# Patient Record
Sex: FEMALE | Race: AMERICAN INDIAN OR ALASKA NATIVE
[De-identification: names, ages, dates, MRNs, and addresses within clinical notes are randomized per-mention and may not be internally consistent; named-entity substitution may affect disease eponyms.]

---

## 2017-10-19 ENCOUNTER — HOSPITAL ENCOUNTER (EMERGENCY)
Dept: HOSPITAL 31 - C.ER | Age: 27
Discharge: HOME | End: 2017-10-19
Payer: COMMERCIAL

## 2017-10-19 VITALS — SYSTOLIC BLOOD PRESSURE: 150 MMHG | DIASTOLIC BLOOD PRESSURE: 93 MMHG

## 2017-10-19 VITALS — TEMPERATURE: 98 F | HEART RATE: 89 BPM | RESPIRATION RATE: 16 BRPM | OXYGEN SATURATION: 99 %

## 2017-10-19 VITALS — BODY MASS INDEX: 25.3 KG/M2

## 2017-10-19 DIAGNOSIS — K31.84: ICD-10-CM

## 2017-10-19 DIAGNOSIS — E11.43: Primary | ICD-10-CM

## 2017-10-19 DIAGNOSIS — R11.10: ICD-10-CM

## 2017-10-19 DIAGNOSIS — Z87.891: ICD-10-CM

## 2017-10-19 DIAGNOSIS — I10: ICD-10-CM

## 2017-10-19 DIAGNOSIS — M81.0: ICD-10-CM

## 2017-10-19 LAB
ALBUMIN/GLOB SERPL: 1.1 {RATIO} (ref 1–2.1)
ALP SERPL-CCNC: 70 U/L (ref 38–126)
ALT SERPL-CCNC: 60 U/L (ref 9–52)
AST SERPL-CCNC: 32 U/L (ref 14–36)
BASOPHILS # BLD AUTO: 0.1 K/UL (ref 0–0.2)
BASOPHILS NFR BLD: 0.9 % (ref 0–2)
BILIRUB SERPL-MCNC: 0.8 MG/DL (ref 0.2–1.3)
BILIRUB UR-MCNC: NEGATIVE MG/DL
BUN SERPL-MCNC: 15 MG/DL (ref 7–17)
CALCIUM SERPL-MCNC: 9.8 MG/DL (ref 8.6–10.4)
CHLORIDE SERPL-SCNC: 100 MMOL/L (ref 98–107)
CO2 SERPL-SCNC: 23 MMOL/L (ref 22–30)
EOSINOPHIL # BLD AUTO: 0 K/UL (ref 0–0.7)
EOSINOPHIL NFR BLD: 0.4 % (ref 0–4)
ERYTHROCYTE [DISTWIDTH] IN BLOOD BY AUTOMATED COUNT: 13.3 % (ref 11.5–14.5)
GLOBULIN SER-MCNC: 4.5 GM/DL (ref 2.2–3.9)
GLUCOSE SERPL-MCNC: 203 MG/DL (ref 65–105)
GLUCOSE UR STRIP-MCNC: NORMAL MG/DL
HCT VFR BLD CALC: 39.7 % (ref 34–47)
HYALINE CASTS #/AREA URNS LPF: (no result) /LPF (ref 0–2)
KETONES UR STRIP-MCNC: NEGATIVE MG/DL
LEUKOCYTE ESTERASE UR-ACNC: (no result) LEU/UL
LYMPHOCYTES # BLD AUTO: 1.7 K/UL (ref 1–4.3)
LYMPHOCYTES NFR BLD AUTO: 18.3 % (ref 20–40)
MCH RBC QN AUTO: 28.1 PG (ref 27–31)
MCHC RBC AUTO-ENTMCNC: 33.9 G/DL (ref 33–37)
MCV RBC AUTO: 83 FL (ref 81–99)
MONOCYTES # BLD: 0.5 K/UL (ref 0–0.8)
MONOCYTES NFR BLD: 5.4 % (ref 0–10)
NRBC BLD AUTO-RTO: 0 % (ref 0–2)
PH UR STRIP: 5 [PH] (ref 5–8)
PLATELET # BLD: 339 K/UL (ref 130–400)
PMV BLD AUTO: 7.7 FL (ref 7.2–11.7)
POTASSIUM SERPL-SCNC: 3.7 MMOL/L (ref 3.6–5.2)
PROT SERPL-MCNC: 9.3 G/DL (ref 6.3–8.3)
PROT UR STRIP-MCNC: (no result) MG/DL
RBC # UR STRIP: NEGATIVE /UL
RBC #/AREA URNS HPF: 3 /HPF (ref 0–3)
SODIUM SERPL-SCNC: 138 MMOL/L (ref 132–148)
SP GR UR STRIP: 1.01 (ref 1–1.03)
UROBILINOGEN UR-MCNC: NORMAL MG/DL (ref 0.2–1)
WBC # BLD AUTO: 9.1 K/UL (ref 4.8–10.8)
WBC #/AREA URNS HPF: < 1 /HPF (ref 0–5)

## 2017-10-19 PROCEDURE — 81001 URINALYSIS AUTO W/SCOPE: CPT

## 2017-10-19 PROCEDURE — 85025 COMPLETE CBC W/AUTO DIFF WBC: CPT

## 2017-10-19 PROCEDURE — 84703 CHORIONIC GONADOTROPIN ASSAY: CPT

## 2017-10-19 PROCEDURE — 82948 REAGENT STRIP/BLOOD GLUCOSE: CPT

## 2017-10-19 PROCEDURE — 96375 TX/PRO/DX INJ NEW DRUG ADDON: CPT

## 2017-10-19 PROCEDURE — 80053 COMPREHEN METABOLIC PANEL: CPT

## 2017-10-19 PROCEDURE — 84702 CHORIONIC GONADOTROPIN TEST: CPT

## 2017-10-19 PROCEDURE — 96374 THER/PROPH/DIAG INJ IV PUSH: CPT

## 2017-10-19 PROCEDURE — 83690 ASSAY OF LIPASE: CPT

## 2017-10-19 PROCEDURE — 99284 EMERGENCY DEPT VISIT MOD MDM: CPT

## 2017-10-19 NOTE — C.PDOC
History Of Present Illness


27 year old female, with history of chronic episodes of vomiting, presents to 

the ED for evaluation of nausea and vomiting which began around 3 days ago. 

Patient denies fever, chills, or changes in bowel movements at this time. 


Chief Complaint (Nursing): GI Problem


History Per: Patient


History/Exam Limitations: no limitations


Onset/Duration Of Symptoms: Days (3)


Current Symptoms Are (Timing): Still Present


Associated Symptoms: Nausea, Vomiting.  denies: Fever, Chills, Diarrhea, 

Constipation


Additional History Per: Patient


Abnormal Vaginal Bleeding: No





Past Medical History


Reviewed: Historical Data, Nursing Documentation, Vital Signs


Vital Signs: 


 Last Vital Signs











Temp  97.6 F   10/19/17 04:19


 


Pulse  92 H  10/19/17 04:19


 


Resp  18   10/19/17 04:19


 


BP  150/93 H  10/19/17 04:19


 


Pulse Ox  97   10/19/17 05:38














- Medical History


PMH: Anxiety, Depression, Diabetes, Gastritis, HTN, Osteoporosis (pt unsure of 

this hx), Sickle Cell Disease ("JUST FOUND OUT A MONTH AGO"), Chronic Pain


   Denies: Crohn's Disease, Diverticulitis, Gall Bladder Disease, HIV, 

Pancreatitis, Chronic Kidney Disease


Surgical History: Endoscopy





- [facility] Procedures








ESOPHAGOGASTRODUODENOSCOPY [EGD] W/CLOSED BIOPSY (06/06/14)


INJECT/INFUSE ELECTROLYT (09/15/15)


INJECT/INFUSE NEC (09/15/15)








Family History: States: Unknown Family Hx, Diabetes





- Social History


Hx Tobacco Use: Yes


Hx Alcohol Use: No


Hx Substance Use: Yes





- Immunization History


Hx Tetanus Toxoid Vaccination: No


Hx Influenza Vaccination: Yes


Hx Pneumococcal Vaccination: No





Review Of Systems


Constitutional: Negative for: Fever, Chills


Gastrointestinal: Positive for: Nausea, Vomiting.  Negative for: Diarrhea, 

Constipation





Physical Exam





- Physical Exam


Appears: Non-toxic, No Acute Distress


Skin: Normal Color, Warm, Dry


Head: Atraumatic, Normacephalic


Eye(s): bilateral: Normal Inspection


Oral Mucosa: Dry


Neck: Supple


Chest: Symmetrical, No Deformity, No Tenderness


Cardiovascular: Rhythm Regular, No Murmur


Respiratory: Normal Breath Sounds, No Rales, No Rhonchi, No Wheezing


Gastrointestinal/Abdominal: Soft, No Tenderness, No Guarding, No Rebound


Extremity: Normal ROM, Capillary Refill (less than 2 seconds )


Neurological/Psych: Oriented x3, Normal Speech, Normal Cognition


Gait: Steady





ED Course And Treatment





- Laboratory Results


Result Diagrams: 


 10/19/17 04:38





 10/19/17 04:38


O2 Sat by Pulse Oximetry: 97 (on RA)


Pulse Ox Interpretation: Normal


Progress Note: Bloodwork and UA ordered and reviewed. Benadryl IVP, Reglan IVP 

and IV Fluids ordered and reviewed.





Disposition


Counseled Patient/Family Regarding: Diagnosis





- Disposition


Referrals: 


Non [facility] Provider, [Primary Care Provider] - 


Disposition: HOME/ ROUTINE


Disposition Time: 05:37


Condition: STABLE


Prescriptions: 


Metoclopramide [Reglan] 1 tab PO TID PRN #25 tab


 PRN Reason: Nausea/Vomiting


Instructions:  Acute Nausea and Vomiting (ED)


Forms:  [facility] (English)





- Clinical Impression


Clinical Impression: 


 Vomiting, Gastroparesis diabeticorum








- Scribe Statement


The provider has reviewed the documentation as recorded by the Scribe (Amalia Angel)


Provider Attestation: 








All medical record entries made by the Scribe were at my direction and 

personally dictated by me. I have reviewed the chart and agree that the record 

accurately reflects my personal performance of the history, physical exam, 

medical decision making, and the department course for this patient. I have 

also personally directed, reviewed, and agree with the discharge instructions 

and disposition.

## 2017-12-02 ENCOUNTER — HOSPITAL ENCOUNTER (EMERGENCY)
Dept: HOSPITAL 31 - C.ER | Age: 27
Discharge: HOME | End: 2017-12-02
Payer: COMMERCIAL

## 2017-12-02 VITALS — TEMPERATURE: 98.8 F

## 2017-12-02 VITALS — SYSTOLIC BLOOD PRESSURE: 152 MMHG | OXYGEN SATURATION: 97 % | DIASTOLIC BLOOD PRESSURE: 98 MMHG

## 2017-12-02 VITALS — HEART RATE: 79 BPM | RESPIRATION RATE: 19 BRPM

## 2017-12-02 VITALS — BODY MASS INDEX: 25.3 KG/M2

## 2017-12-02 DIAGNOSIS — K31.84: Primary | ICD-10-CM

## 2017-12-02 DIAGNOSIS — Z87.891: ICD-10-CM

## 2017-12-02 DIAGNOSIS — G89.29: ICD-10-CM

## 2017-12-02 DIAGNOSIS — E11.9: ICD-10-CM

## 2017-12-02 DIAGNOSIS — R10.9: ICD-10-CM

## 2017-12-02 DIAGNOSIS — I10: ICD-10-CM

## 2017-12-02 LAB
ALBUMIN/GLOB SERPL: 1.2 {RATIO} (ref 1–2.1)
ALP SERPL-CCNC: 54 U/L (ref 38–126)
ALT SERPL-CCNC: 51 U/L (ref 9–52)
AST SERPL-CCNC: 33 U/L (ref 14–36)
BASOPHILS # BLD AUTO: 0 K/UL (ref 0–0.2)
BASOPHILS NFR BLD: 0.6 % (ref 0–2)
BILIRUB SERPL-MCNC: 0.5 MG/DL (ref 0.2–1.3)
BILIRUB UR-MCNC: NEGATIVE MG/DL
BUN SERPL-MCNC: 9 MG/DL (ref 7–17)
CALCIUM SERPL-MCNC: 8.3 MG/DL (ref 8.6–10.4)
CHLORIDE SERPL-SCNC: 106 MMOL/L (ref 98–107)
CO2 SERPL-SCNC: 26 MMOL/L (ref 22–30)
EOSINOPHIL # BLD AUTO: 0.1 K/UL (ref 0–0.7)
EOSINOPHIL NFR BLD: 0.9 % (ref 0–4)
ERYTHROCYTE [DISTWIDTH] IN BLOOD BY AUTOMATED COUNT: 13.3 % (ref 11.5–14.5)
GLOBULIN SER-MCNC: 3.3 GM/DL (ref 2.2–3.9)
GLUCOSE SERPL-MCNC: 138 MG/DL (ref 65–105)
GLUCOSE UR STRIP-MCNC: NORMAL MG/DL
HCT VFR BLD CALC: 39.5 % (ref 34–47)
KETONES UR STRIP-MCNC: NEGATIVE MG/DL
LEUKOCYTE ESTERASE UR-ACNC: (no result) LEU/UL
LYMPHOCYTES # BLD AUTO: 2.6 K/UL (ref 1–4.3)
LYMPHOCYTES NFR BLD AUTO: 38.4 % (ref 20–40)
MCH RBC QN AUTO: 27 PG (ref 27–31)
MCHC RBC AUTO-ENTMCNC: 33 G/DL (ref 33–37)
MCV RBC AUTO: 81.8 FL (ref 81–99)
MONOCYTES # BLD: 0.4 K/UL (ref 0–0.8)
MONOCYTES NFR BLD: 5.3 % (ref 0–10)
NRBC BLD AUTO-RTO: 0.1 % (ref 0–2)
PH UR STRIP: 5 [PH] (ref 5–8)
PLATELET # BLD: 272 K/UL (ref 130–400)
PMV BLD AUTO: 8.2 FL (ref 7.2–11.7)
POTASSIUM SERPL-SCNC: 3.9 MMOL/L (ref 3.6–5.2)
PROT SERPL-MCNC: 7.5 G/DL (ref 6.3–8.3)
PROT UR STRIP-MCNC: (no result) MG/DL
RBC # UR STRIP: (no result) /UL
RBC #/AREA URNS HPF: 3 /HPF (ref 0–3)
SODIUM SERPL-SCNC: 140 MMOL/L (ref 132–148)
SP GR UR STRIP: 1.01 (ref 1–1.03)
UROBILINOGEN UR-MCNC: NORMAL MG/DL (ref 0.2–1)
WBC # BLD AUTO: 6.7 K/UL (ref 4.8–10.8)
WBC #/AREA URNS HPF: 1 /HPF (ref 0–5)

## 2017-12-02 PROCEDURE — 81001 URINALYSIS AUTO W/SCOPE: CPT

## 2017-12-02 PROCEDURE — 85025 COMPLETE CBC W/AUTO DIFF WBC: CPT

## 2017-12-02 PROCEDURE — 99285 EMERGENCY DEPT VISIT HI MDM: CPT

## 2017-12-02 PROCEDURE — 96376 TX/PRO/DX INJ SAME DRUG ADON: CPT

## 2017-12-02 PROCEDURE — 83690 ASSAY OF LIPASE: CPT

## 2017-12-02 PROCEDURE — 74000: CPT

## 2017-12-02 PROCEDURE — 83605 ASSAY OF LACTIC ACID: CPT

## 2017-12-02 PROCEDURE — 96375 TX/PRO/DX INJ NEW DRUG ADDON: CPT

## 2017-12-02 PROCEDURE — 80053 COMPREHEN METABOLIC PANEL: CPT

## 2017-12-02 PROCEDURE — 84703 CHORIONIC GONADOTROPIN ASSAY: CPT

## 2017-12-02 PROCEDURE — 96374 THER/PROPH/DIAG INJ IV PUSH: CPT

## 2017-12-02 NOTE — C.PDOC
History Of Present Illness


27 year old female with Hx of gastroparesis presents to the ED c/o vomit and 

abdominal pain for the past 2 weeks. Patient states she went to her PMD who 

prescribed her Zofran, she presents today because according to her the pain 

worsened and she "can't take it". Patient denies fever, diarrhea, bloody stools

, hematuria, back pain, dizziness. 


Time Seen by Provider: 12/02/17 15:16


Chief Complaint (Nursing): GI Problem


History Per: Patient


History/Exam Limitations: no limitations


Onset/Duration Of Symptoms: Days


Current Symptoms Are (Timing): Still Present


Location Of Pain/Discomfort: Diffuse


Radiation Of Pain To:: None


Quality Of Discomfort: "Pain"


Associated Symptoms: Vomiting, Loss Of Appetite.  denies: Fever, Diarrhea


Recent travel outside of the [place]: No


Additional History Per: Patient


Abnormal Vaginal Bleeding: No





Past Medical History


Reviewed: Historical Data, Nursing Documentation, Vital Signs


Vital Signs: 


 Last Vital Signs











Temp  98.8 F   12/02/17 15:14


 


Pulse  84   12/02/17 15:14


 


Resp  22   12/02/17 15:14


 


BP  174/100 H  12/02/17 15:14


 


Pulse Ox  98   12/02/17 15:48














- Medical History


PMH: Anxiety, Depression, Diabetes, Gastritis, HTN, Osteoporosis (pt unsure of 

this hx), Sickle Cell Disease ("JUST FOUND OUT A MONTH AGO"), Chronic Pain


   Denies: Crohn's Disease, Diverticulitis, Gall Bladder Disease, HIV, 

Pancreatitis, Chronic Kidney Disease


Surgical History: Endoscopy





- [facility] Procedures








ESOPHAGOGASTRODUODENOSCOPY [EGD] W/CLOSED BIOPSY (06/06/14)


INJECT/INFUSE ELECTROLYT (09/15/15)


INJECT/INFUSE NEC (09/15/15)








Family History: States: Unknown Family Hx, Diabetes





- Social History


Hx Tobacco Use: Yes


Hx Alcohol Use: No


Hx Substance Use: Yes





- Immunization History


Hx Tetanus Toxoid Vaccination: No


Hx Influenza Vaccination: No


Hx Pneumococcal Vaccination: No





Review Of Systems


Constitutional: Negative for: Fever, Chills


Cardiovascular: Negative for: Chest Pain


Respiratory: Negative for: Cough, Shortness of Breath


Gastrointestinal: Positive for: Vomiting, Abdominal Pain.  Negative for: Nausea

, Diarrhea


Genitourinary: Negative for: Dysuria, Hematuria


Skin: Negative for: Rash


Neurological: Negative for: Weakness, Numbness





Physical Exam





- Physical Exam


Appears: Non-toxic, In Acute Distress (secondary to pain)


Skin: Normal Color, Warm, Dry


Head: Atraumatic, Normacephalic


Oral Mucosa: Moist, No Drooling


Throat: Normal, No Erythema, No Exudate


Neck: Normal ROM, Supple


Chest: Symmetrical


Cardiovascular: Rhythm Regular, No Murmur


Respiratory: Normal Breath Sounds, No Rales, No Rhonchi, No Wheezing


Gastrointestinal/Abdominal: Soft, Tenderness (Mild diffuse), No Organomegaly, 

No Mass, No Distention, No Guarding, No Rebound, No Hernia


Back: No CVA Tenderness


Extremity: Normal ROM, No Pedal Edema, No Calf Tenderness, No Deformity, No 

Swelling


Neurological/Psych: Oriented x3, Normal Speech, Normal Cognition


Gait: Steady





ED Course And Treatment





- Laboratory Results


Result Diagrams: 


 12/02/17 15:46





 12/02/17 15:46


O2 Sat by Pulse Oximetry: 98 (On RA)


Pulse Ox Interpretation: Normal


Progress Note: Plan:  -Blood work, UA ordered.  -Abdomen X -Ray ordered.  -IV 

fluids given.  -Pepecid 20 mg IVP given.  -Phenergan 25 mg IVP given.  -Zofran 

4 mg IVP given





Disposition


Doctor Will See Patient In The: Office


Counseled Patient/Family Regarding: Studies Performed, Diagnosis, Need For 

Followup, Rx Given





- Disposition


Disposition: HOME/ ROUTINE


Disposition Time: 18:15


Condition: FAIR


Prescriptions: 


oxyCODONE/Acetaminophen [Percocet 5/325 mg Tab] 1 ea PO TID PRN #10 tab


 PRN Reason: Pain, Moderate (4-7)


Instructions:  Abdominal Pain (ED)


Forms:  [facility] (English)





- Clinical Impression


Clinical Impression: 


 Gastroparesis, Chronic abdominal pain








- Scribe Statement


The provider has reviewed the documentation as recorded by the Scribe





Juan Urbano





All medical record entries made by the Scribe were at my direction and 

personally dictated by me. I have reviewed the chart and agree that the record 

accurately reflects my personal performance of the history, physical exam, 

medical decision making, and the department course for this patient. I have 

also personally directed, reviewed, and agree with the discharge instructions 

and disposition.

## 2017-12-02 NOTE — RAD
HISTORY:

abd pain  



COMPARISON:

No prior.



FINDINGS:



BOWEL:

No suspicious bowel gas pattern. No obstruction. No free air. 



BONES:

Normal.



OTHER FINDINGS:

No abnormal internal calcifications including the pelvis.



IMPRESSION:

Nonobstructive bowel gas pattern. No free intraperitoneal gas. CT is 

available for follow-up if clinically warranted.

## 2017-12-20 ENCOUNTER — HOSPITAL ENCOUNTER (EMERGENCY)
Dept: HOSPITAL 31 - C.ER | Age: 27
Discharge: HOME | End: 2017-12-20
Payer: COMMERCIAL

## 2017-12-20 VITALS — SYSTOLIC BLOOD PRESSURE: 132 MMHG | TEMPERATURE: 98 F | HEART RATE: 81 BPM | DIASTOLIC BLOOD PRESSURE: 89 MMHG

## 2017-12-20 VITALS — BODY MASS INDEX: 25.3 KG/M2

## 2017-12-20 VITALS — RESPIRATION RATE: 20 BRPM

## 2017-12-20 DIAGNOSIS — K31.84: ICD-10-CM

## 2017-12-20 DIAGNOSIS — Z87.891: ICD-10-CM

## 2017-12-20 DIAGNOSIS — G89.29: Primary | ICD-10-CM

## 2017-12-20 DIAGNOSIS — E11.43: ICD-10-CM

## 2017-12-20 DIAGNOSIS — I10: ICD-10-CM

## 2017-12-20 DIAGNOSIS — Z79.84: ICD-10-CM

## 2017-12-20 LAB
ALBUMIN/GLOB SERPL: 0.9 {RATIO} (ref 1–2.1)
ALP SERPL-CCNC: 47 U/L (ref 38–126)
ALT SERPL-CCNC: 61 U/L (ref 9–52)
AST SERPL-CCNC: 38 U/L (ref 14–36)
BASOPHILS # BLD AUTO: 0.1 K/UL (ref 0–0.2)
BASOPHILS NFR BLD: 1 % (ref 0–2)
BILIRUB SERPL-MCNC: 1.1 MG/DL (ref 0.2–1.3)
BILIRUB UR-MCNC: NEGATIVE MG/DL
BUN SERPL-MCNC: 15 MG/DL (ref 7–17)
CALCIUM SERPL-MCNC: 8.9 MG/DL (ref 8.6–10.4)
CHLORIDE SERPL-SCNC: 100 MMOL/L (ref 98–107)
CO2 SERPL-SCNC: 18 MMOL/L (ref 22–30)
EOSINOPHIL # BLD AUTO: 0 K/UL (ref 0–0.7)
EOSINOPHIL NFR BLD: 0.1 % (ref 0–4)
ERYTHROCYTE [DISTWIDTH] IN BLOOD BY AUTOMATED COUNT: 13.5 % (ref 11.5–14.5)
GLOBULIN SER-MCNC: 4.9 GM/DL (ref 2.2–3.9)
GLUCOSE SERPL-MCNC: 241 MG/DL (ref 65–105)
GLUCOSE UR STRIP-MCNC: (no result) MG/DL
GRAN CASTS #/AREA URNS LPF: 1 /LPF (ref 0–1)
HCT VFR BLD CALC: 40 % (ref 34–47)
KETONES UR STRIP-MCNC: (no result) MG/DL
LEUKOCYTE ESTERASE UR-ACNC: (no result) LEU/UL
LYMPHOCYTES # BLD AUTO: 1.2 K/UL (ref 1–4.3)
LYMPHOCYTES NFR BLD AUTO: 17.1 % (ref 20–40)
MCH RBC QN AUTO: 27.6 PG (ref 27–31)
MCHC RBC AUTO-ENTMCNC: 33.6 G/DL (ref 33–37)
MCV RBC AUTO: 82 FL (ref 81–99)
MONOCYTES # BLD: 0.2 K/UL (ref 0–0.8)
MONOCYTES NFR BLD: 3.2 % (ref 0–10)
NRBC BLD AUTO-RTO: 0 % (ref 0–2)
PH UR STRIP: 5 [PH] (ref 5–8)
PLATELET # BLD: 346 K/UL (ref 130–400)
PMV BLD AUTO: 8 FL (ref 7.2–11.7)
POTASSIUM SERPL-SCNC: 4.5 MMOL/L (ref 3.6–5.2)
PROT SERPL-MCNC: 9.5 G/DL (ref 6.3–8.3)
PROT UR STRIP-MCNC: (no result) MG/DL
RBC # UR STRIP: (no result) /UL
RBC #/AREA URNS HPF: 12 /HPF (ref 0–3)
SODIUM SERPL-SCNC: 134 MMOL/L (ref 132–148)
SP GR UR STRIP: 1.02 (ref 1–1.03)
UROBILINOGEN UR-MCNC: NORMAL MG/DL (ref 0.2–1)
WBC # BLD AUTO: 7.1 K/UL (ref 4.8–10.8)
WBC #/AREA URNS HPF: 1 /HPF (ref 0–5)

## 2017-12-20 PROCEDURE — 85025 COMPLETE CBC W/AUTO DIFF WBC: CPT

## 2017-12-20 PROCEDURE — 80053 COMPREHEN METABOLIC PANEL: CPT

## 2017-12-20 PROCEDURE — 81001 URINALYSIS AUTO W/SCOPE: CPT

## 2017-12-20 PROCEDURE — 84703 CHORIONIC GONADOTROPIN ASSAY: CPT

## 2017-12-20 PROCEDURE — 99284 EMERGENCY DEPT VISIT MOD MDM: CPT

## 2017-12-20 PROCEDURE — 96375 TX/PRO/DX INJ NEW DRUG ADDON: CPT

## 2017-12-20 PROCEDURE — 96374 THER/PROPH/DIAG INJ IV PUSH: CPT

## 2017-12-20 PROCEDURE — 83690 ASSAY OF LIPASE: CPT

## 2017-12-20 NOTE — C.PDOC
History Of Present Illness


27 year old female presents to the ER with a complaint of nausea and vomiting 

"all day". Denies fever, chills, or abdominal pain. Patient reports she vomited 

food 4 times. 





Patient has a Hx of diabetes, HTN, and gastroparesis and is on protonix, 

metformin, and lisinopril. She reports having an endoscopy done by Dr. Omer 

yesterday that she also reports was normal.


Time Seen by Provider: 12/20/17 05:04


Chief Complaint (Nursing): Abdominal Pain


History Per: Patient


History/Exam Limitations: no limitations


Onset/Duration Of Symptoms: Hrs


Current Symptoms Are (Timing): Still Present


Pain Scale Rating Of: 2


Radiation Of Pain To:: None


Quality Of Discomfort: Unable To Describe


Associated Symptoms: Nausea, Vomiting.  denies: Fever, Chills


Exacerbating Factors: None


Alleviating Factors: None


Recent travel outside of the [place]: No


Abnormal Vaginal Bleeding: No





Past Medical History


Reviewed: Historical Data, Nursing Documentation, Vital Signs


Vital Signs: 


 Last Vital Signs











Temp  98 F   12/20/17 07:07


 


Pulse  81   12/20/17 07:07


 


Resp  20   12/20/17 07:07


 


BP  132/89   12/20/17 07:07


 


Pulse Ox  97   12/20/17 07:07














- Medical History


PMH: Anxiety, Depression, Diabetes, Gastritis, HTN, Osteoporosis (pt unsure of 

this hx), Sickle Cell Disease ("JUST FOUND OUT A MONTH AGO"), Chronic Pain


Other PMH: gastroparesis


Surgical History: No Surg Hx





- [facility] Procedures








ESOPHAGOGASTRODUODENOSCOPY [EGD] W/CLOSED BIOPSY (06/06/14)


INJECT/INFUSE ELECTROLYT (09/15/15)


INJECT/INFUSE NEC (09/15/15)








Family History: States: Unknown Family Hx, Diabetes





- Social History


Hx Tobacco Use: Yes


Hx Alcohol Use: No


Hx Substance Use: No





- Immunization History


Hx Tetanus Toxoid Vaccination: Yes


Hx Influenza Vaccination: Yes


Hx Pneumococcal Vaccination: Yes





Review Of Systems


Constitutional: Negative for: Fever, Chills


Cardiovascular: Negative for: Chest Pain, Palpitations


Respiratory: Negative for: Cough, Shortness of Breath, SOB with Excertion, 

Pleuritic Pain, Sputum


Gastrointestinal: Positive for: Nausea, Vomiting.  Negative for: Abdominal Pain

, Diarrhea, Constipation, Melena, Hematochezia, Hematemesis, Rectal Pain


Genitourinary: Negative for: Dysuria, Hematuria


Musculoskeletal: Negative for: Neck Pain


Skin: Negative for: Rash


Neurological: Negative for: Weakness, Confusion





Physical Exam





- Physical Exam


Appears: Non-toxic, No Acute Distress


Skin: Normal Color, Warm, Dry


Head: Atraumatic, Normacephalic


Eye(s): bilateral: Normal Inspection


Oral Mucosa: Moist


Teeth: Normal Dentition


Gingiva: Normal Appearing


Throat: Normal


Neck: Normal


Chest: Symmetrical, No Tenderness


Cardiovascular: Rhythm Regular


Respiratory: Normal Breath Sounds, No Rales, No Rhonchi, No Wheezing


Gastrointestinal/Abdominal: Normal Exam, Bowel Sounds, Soft, No Tenderness, No 

Organomegaly, No Mass, No Distention, No Guarding, No Rebound, No Hernia, No 

Ascites


Back: Normal Inspection


Extremity: Normal ROM


Extremity: Bilateral: Atraumatic


Neurological/Psych: Oriented x3, Normal Speech





ED Course And Treatment





- Laboratory Results


Result Diagrams: 


 12/20/17 06:11





 12/20/17 06:11


O2 Sat by Pulse Oximetry: 98 (Room air)


Pulse Ox Interpretation: Normal





Medical Decision Making


Medical Decision Making: 





Plan:


* CMP


* Lipase


* CBC


* Obstructive series


* Upreg


* UA


* Maalox


* Donnatal


* Pepcid


* IV fluids


* Toradol


* Zofran





labs reviewed and unremarkable. Pt given GI cocktail and pepcid and zofran, and 

toradol and IVF. 





On reevaluation, patient reports improvement of pain and requests to go home, 

no indication for plain films at this time. 





Disposition





- Disposition


Referrals: 


[facility] [Outside]


[facility] [Outside]


[facility] [Outside]


Disposition: HOME/ ROUTINE


Disposition Time: 06:30


Condition: GOOD


Additional Instructions: 


return as needed for severe worsening pain


Instructions:  Abdominal Pain (ED)


Forms:  General Discharge Instructions, [facility] (English)





- Clinical Impression


Clinical Impression: 


 Abdominal pain, Gastroparesis, Gastroparesis due to DM, Chronic abdominal pain








- Scribe Statement


The provider has reviewed the documentation as recorded by the Scribe





Farshad Alford


All medical record entries made by the Scribe were at my direction and 

personally dictated by me. I have reviewed the chart and agree that the record 

accurately reflects my personal performance of the history, physical exam, 

medical decision making, and the department course for this patient. I have 

also personally directed, reviewed, and agree with the discharge instructions 

and disposition.

## 2017-12-21 VITALS — OXYGEN SATURATION: 98 %

## 2017-12-26 ENCOUNTER — HOSPITAL ENCOUNTER (OUTPATIENT)
Dept: HOSPITAL 42 - ED | Age: 27
Setting detail: OBSERVATION
LOS: 2 days | Discharge: HOME | End: 2017-12-28
Attending: HOSPITALIST | Admitting: HOSPITALIST
Payer: COMMERCIAL

## 2017-12-26 VITALS — BODY MASS INDEX: 23.8 KG/M2

## 2017-12-26 DIAGNOSIS — Z80.3: ICD-10-CM

## 2017-12-26 DIAGNOSIS — R11.10: ICD-10-CM

## 2017-12-26 DIAGNOSIS — K21.9: ICD-10-CM

## 2017-12-26 DIAGNOSIS — F12.90: ICD-10-CM

## 2017-12-26 DIAGNOSIS — G89.29: ICD-10-CM

## 2017-12-26 DIAGNOSIS — K59.03: Primary | ICD-10-CM

## 2017-12-26 DIAGNOSIS — F17.210: ICD-10-CM

## 2017-12-26 DIAGNOSIS — K76.0: ICD-10-CM

## 2017-12-26 DIAGNOSIS — I10: ICD-10-CM

## 2017-12-26 DIAGNOSIS — Z79.4: ICD-10-CM

## 2017-12-26 DIAGNOSIS — Z83.3: ICD-10-CM

## 2017-12-26 DIAGNOSIS — D57.1: ICD-10-CM

## 2017-12-26 DIAGNOSIS — M81.0: ICD-10-CM

## 2017-12-26 DIAGNOSIS — J18.9: ICD-10-CM

## 2017-12-26 DIAGNOSIS — E10.43: ICD-10-CM

## 2017-12-26 DIAGNOSIS — K31.84: ICD-10-CM

## 2017-12-26 LAB
ALBUMIN SERPL-MCNC: 4.8 G/DL (ref 3–4.8)
ALBUMIN/GLOB SERPL: 1.4 {RATIO} (ref 1.1–1.8)
ALT SERPL-CCNC: 55 U/L (ref 7–56)
APPEARANCE UR: CLEAR
AST SERPL-CCNC: 33 U/L (ref 14–36)
BACTERIA #/AREA URNS HPF: (no result) /[HPF]
BASOPHILS # BLD AUTO: 0.03 K/MM3 (ref 0–2)
BASOPHILS NFR BLD: 0.2 % (ref 0–3)
BILIRUB UR-MCNC: NEGATIVE MG/DL
BUN SERPL-MCNC: 10 MG/DL (ref 7–21)
CALCIUM SERPL-MCNC: 9.8 MG/DL (ref 8.4–10.5)
COLOR UR: (no result)
EOSINOPHIL # BLD: 0 10*3/UL (ref 0–0.7)
EOSINOPHIL NFR BLD: 0.2 % (ref 1.5–5)
EPI CELLS #/AREA URNS HPF: (no result) /HPF (ref 0–5)
ERYTHROCYTE [DISTWIDTH] IN BLOOD BY AUTOMATED COUNT: 13.6 % (ref 11.5–14.5)
GFR NON-AFRICAN AMERICAN: > 60
GLUCOSE UR STRIP-MCNC: NEGATIVE MG/DL
GRANULOCYTES # BLD: 9.97 10*3/UL (ref 1.4–6.5)
GRANULOCYTES NFR BLD: 82.8 % (ref 50–68)
HGB BLD-MCNC: 12.4 G/DL (ref 12–16)
LEUKOCYTE ESTERASE UR-ACNC: NEGATIVE LEU/UL
LIPASE SERPL-CCNC: 123 U/L (ref 23–300)
LYMPHOCYTES # BLD: 1.4 10*3/UL (ref 1.2–3.4)
LYMPHOCYTES NFR BLD AUTO: 11.7 % (ref 22–35)
MAGNESIUM SERPL-MCNC: 1.4 MG/DL (ref 1.7–2.2)
MCH RBC QN AUTO: 27.4 PG (ref 25–35)
MCHC RBC AUTO-ENTMCNC: 34.5 G/DL (ref 31–37)
MCV RBC AUTO: 79.2 FL (ref 80–105)
MONOCYTES # BLD AUTO: 0.6 10*3/UL (ref 0.1–0.6)
MONOCYTES NFR BLD: 5.1 % (ref 1–6)
PH UR STRIP: 6 [PH] (ref 4.7–8)
PLATELET # BLD: 278 10^3/UL (ref 120–450)
PMV BLD AUTO: 9.2 FL (ref 7–11)
PROT UR STRIP-MCNC: 30 MG/DL
RBC # BLD AUTO: 4.53 10^6/UL (ref 3.5–6.1)
RBC # UR STRIP: (no result) /UL
SP GR UR STRIP: 1.02 (ref 1–1.03)
URINE NITRATE: NEGATIVE
UROBILINOGEN UR STRIP-ACNC: 0.2 E.U./DL
WBC # BLD AUTO: 12 10^3/UL (ref 4.5–11)
WBC #/AREA URNS HPF: (no result) /HPF (ref 0–6)

## 2017-12-26 PROCEDURE — 96372 THER/PROPH/DIAG INJ SC/IM: CPT

## 2017-12-26 PROCEDURE — 96375 TX/PRO/DX INJ NEW DRUG ADDON: CPT

## 2017-12-26 PROCEDURE — 83690 ASSAY OF LIPASE: CPT

## 2017-12-26 PROCEDURE — 80053 COMPREHEN METABOLIC PANEL: CPT

## 2017-12-26 PROCEDURE — 84484 ASSAY OF TROPONIN QUANT: CPT

## 2017-12-26 PROCEDURE — 84100 ASSAY OF PHOSPHORUS: CPT

## 2017-12-26 PROCEDURE — 36415 COLL VENOUS BLD VENIPUNCTURE: CPT

## 2017-12-26 PROCEDURE — 93005 ELECTROCARDIOGRAM TRACING: CPT

## 2017-12-26 PROCEDURE — 96367 TX/PROPH/DG ADDL SEQ IV INF: CPT

## 2017-12-26 PROCEDURE — 80324 DRUG SCREEN AMPHETAMINES 1/2: CPT

## 2017-12-26 PROCEDURE — 83036 HEMOGLOBIN GLYCOSYLATED A1C: CPT

## 2017-12-26 PROCEDURE — 80345 DRUG SCREENING BARBITURATES: CPT

## 2017-12-26 PROCEDURE — 76705 ECHO EXAM OF ABDOMEN: CPT

## 2017-12-26 PROCEDURE — 99285 EMERGENCY DEPT VISIT HI MDM: CPT

## 2017-12-26 PROCEDURE — 83735 ASSAY OF MAGNESIUM: CPT

## 2017-12-26 PROCEDURE — 80353 DRUG SCREENING COCAINE: CPT

## 2017-12-26 PROCEDURE — 80320 DRUG SCREEN QUANTALCOHOLS: CPT

## 2017-12-26 PROCEDURE — 96376 TX/PRO/DX INJ SAME DRUG ADON: CPT

## 2017-12-26 PROCEDURE — 71010: CPT

## 2017-12-26 PROCEDURE — 81001 URINALYSIS AUTO W/SCOPE: CPT

## 2017-12-26 PROCEDURE — 82948 REAGENT STRIP/BLOOD GLUCOSE: CPT

## 2017-12-26 PROCEDURE — 84703 CHORIONIC GONADOTROPIN ASSAY: CPT

## 2017-12-26 PROCEDURE — 80349 CANNABINOIDS NATURAL: CPT

## 2017-12-26 PROCEDURE — 80361 OPIATES 1 OR MORE: CPT

## 2017-12-26 PROCEDURE — 96365 THER/PROPH/DIAG IV INF INIT: CPT

## 2017-12-26 PROCEDURE — 74177 CT ABD & PELVIS W/CONTRAST: CPT

## 2017-12-26 PROCEDURE — 83992 ASSAY FOR PHENCYCLIDINE: CPT

## 2017-12-26 PROCEDURE — 80346 BENZODIAZEPINES1-12: CPT

## 2017-12-26 PROCEDURE — 85025 COMPLETE CBC W/AUTO DIFF WBC: CPT

## 2017-12-26 PROCEDURE — 80358 DRUG SCREENING METHADONE: CPT

## 2017-12-26 RX ADMIN — MORPHINE SULFATE PRN MG: 2 INJECTION, SOLUTION INTRAMUSCULAR; INTRAVENOUS at 23:40

## 2017-12-26 NOTE — CP.PCM.HP
<Paul Tavarez - Last Filed: 12/26/17 22:34>





History of Present Illness





- History of Present Illness


History of Present Illness: 





Paul Tavarez DO PGY1 - Internal Medicine H&P





CC: Abdominal pain, nausea, vomiting x3 days





HPI: 28 yo F with PMH of DM type I, GERD, gastroparesis, HTN, and sickle cell 

trait presents to ER complaining of unrelenting abdominal pain, nausea, and 

vomiting with inability to tolerate PO for the past three days. She has had 

similar symptoms, on and off, for about 4 years, which she associates with 

increased stress. She typically gets these symptoms around the time of her 

period. Abdominal pain is burning in nature, occasionally radiates to mid-low 

chest, worsened with meals and lying flat. She reports constant nausea, worse 

with meals/liquids, though she is occasionally able to tolerate some liquids, 

including soup she ate earlier today. She reports vomiting, nonbloody, 

nonbilious. She reports that she last had these symptoms flare up around the 

time of her last period, which was 12/3/17, after which she received at [facility]-

Provera injection for hormonal contraception on 12/5/17; her symptoms and her 

period ended 1-2 days later, but then her symptoms again returned 2-3 days 

later. She denies fever or chills, diarrhea, chest pain, shortness of breath, 

dysuria, hematuria, hemoptysis, hematemesis, hematochezia, melena. She admits 

to cough, which she has "all the time" productive of white/clear sputum. She 

also admits to constipation, requiring self manual disimpaction twice daily. 

Remainder of 12 point was obtained as was negative.





PMH: DM type I (diagnosed age 9), GERD, gastroparesis, HTN, and sickle cell 

trait


PSH: Denies


Soc: Smokes 3 cigarettes daily x17 years; denies alcohol or illicits. Currently 

sexually active with one male partner, uses protection regularly, and receives 

Depo-Medrol injection for hormonal contraception. LMP 12/3/2017. Last received 

depo shot 12/5/2017.


FHx: DM in multiple family members. Breast CA in multiple family members. 

Sickle cell disease and trait in multiple family members.


All: NKDA





Present on Admission





- Present on Admission


Any Indicators Present on Admission: No





Past Patient History





- Infectious Disease


Hx of Infectious Diseases: None





- Tetanus Immunizations


Tetanus Immunization: Unknown





- Past Medical History & Family History


Past Medical History?: Yes





- Past Social History


Smoking Status: Light Smoker < 10 Cigarettes Daily





- CARDIAC


Hx Hypertension: Yes





- PULMONARY


Hx Respiratory Disorders: No





- NEUROLOGICAL


Hx Neurological Disorder: No





- HEENT


Hx HEENT Problems: No





- RENAL


Hx Chronic Kidney Disease: No





- ENDOCRINE/METABOLIC


Hx Diabetes Mellitus Type 1: Yes (dx 9 yrs old)





- HEMATOLOGICAL/ONCOLOGICAL


Hx Sickle Cell Disease: Yes ("JUST FOUND OUT A MONTH AGO")





- INTEGUMENTARY


Hx Dermatological Problems: No





- MUSCULOSKELETAL/RHEUMATOLOGICAL


Hx Osteoporosis: Yes (pt unsure of this hx)





- GASTROINTESTINAL


Hx Gastritis: Yes





- GENITOURINARY/GYNECOLOGICAL


Hx Genitourinary Disorders: No





- PSYCHIATRIC


Hx Anxiety: Yes


Hx Depression: Yes


Hx Substance Use: No





- SURGICAL HISTORY


Hx Surgeries: No





- ANESTHESIA


Hx Anesthesia: Yes


Hx Anesthesia Reactions: No


Hx Malignant Hyperthermia: No





Meds


Allergies/Adverse Reactions: 


 Allergies











Allergy/AdvReac Type Severity Reaction Status Date / Time


 


No Known Allergies Allergy   Verified 12/26/17 16:20














Physical Exam





- Constitutional


Appears: Non-toxic, In Acute Distress (mild)





- Head Exam


Head Exam: ATRAUMATIC, NORMOCEPHALIC





- Eye Exam


Eye Exam: EOMI, Normal appearance, PERRL





- ENT Exam


ENT Exam: Mucous Membranes Moist





- Neck Exam


Neck exam: Positive for: Normal Inspection





- Respiratory Exam


Respiratory Exam: Clear to Auscultation Bilateral, NORMAL BREATHING PATTERN





- Cardiovascular Exam


Cardiovascular Exam: REGULAR RHYTHM, +S1, +S2.  absent: Bradycardia, Tachycardia





- GI/Abdominal Exam


GI & Abdominal Exam: Normal Bowel Sounds, Soft, Tenderness (Epigastric 

tenderness noted when patient was distracted; diffuse tenderness when patient 

was prompted for response).  absent: Distended, Firm, Guarding, Organomegaly, 

Rebound, Rigid





- Extremities Exam


Extremities exam: Negative for: calf tenderness, pedal edema





- Neurological Exam


Neurological exam: Alert, Oriented x3





- Psychiatric Exam


Psychiatric exam: Anxious


Additional comments: 





patient fidgiting constantly on the bed, at one point, started to remove her 

clothes because she felt warm


Labile affect; patient swings from euphoric to tearful during encounter





- Skin


Skin Exam: Dry, Intact, Normal Color





Results





- Vital Signs


Recent Vital Signs: 





 Last Vital Signs











Temp  98.3 F   12/26/17 16:08


 


Pulse  72   12/26/17 21:47


 


Resp  18   12/26/17 21:47


 


BP  123/80   12/26/17 21:47


 


Pulse Ox  98   12/26/17 21:47














- Labs


Result Diagrams: 


 12/26/17 16:35





 12/26/17 16:35


Labs: 





 Laboratory Results - last 24 hr











  12/26/17





  21:20


 


POC Glucose (mg/dL)  131 H














Assessment & Plan





- Assessment and Plan (Free Text)


Assessment: 





28 yo F with PMH of DM type I, GERD, gastroparesis, HTN, and sickle cell trait 

presents to ER complaining of unrelenting abdominal pain, nausea, and vomiting 

with inability to tolerate PO for the past three days, though she did eat soup 

today. Has presented to the ER multiple times in the past for the same 

complaints. Also noted to be complaining of cough, and constipation. 





Plan





Intractable nausea, vomiting, and abdominal pain


- Likely 2/2 gastroparesis vs gastritis vs GERD vs hormonal contraceptive 

adverse effect


- Diabetic female; may present with atypical chest pain; ordered trop and EKG 

to r/o ACS


- Symptoms did not improve significantly with initial symptomatic treatment in 

the ER


- CT A/P shows no evidence of acute intraabdominal findings; Abd US unremarkable


- Patient reportedly previously exhibited drug seeking behavior in prior 

admissions; ordered UDS and Serum alcohol level to r/o substance abuse/

withdrawal


- Patient reports two forms of contraception; low likelihood of pregnancy; 

ordered qualitative pregnancy test to confirm


- Moderately hyperglycemic on admission; pt reports compliance and good 

glycemic control at home; ordered HbA1c to r/o poor glycemic control 

contributing to gastroparesis


- Start PRN Zofran and Reglan; EKG reviewed to assess QTc, low risk of QT 

prolongation


- Morphine and tylenol for moderate/severe and mild pain, respectively


- Maintain NPO except meds and ice chips


- IVF hydration NS @100cc/hr


- GI consult requested, appreciate recs





Possible Pneumonia


- Patient complaining of productive cough, with leukocytosis, and 


- Afebrile, saturating well on RA, not tachypneic


- Continue rocephin and zithromax, as started in ER


- Recheck CBC in AM





Constipation


- Patient reports chronic constipation, requiring manual disimpaction twice 

daily


- Start Miralax daily (if patient can tolerate PO)


- Start dulcolax suppository PRN for constipation


- GI consult requested, as above; appreciate recs





Diabetes, type I


- Patient diagnosed with type I DM 18 years ago; reports compliance with home 

medications and good glycemic control


- Ordered A1c as above; last recorded A1c Jan 2017


- Start SSI Low with accucheck q6h while NPO





HTN


- BP well controlled on admission


- Resume home lisinopril; hold for SBP <120, DBP <80





GERD


- Patient had EGD in 2/2016 with Dr. Luu which showed LA grade A reflux 

esophagitis, gastroparesis, and gastritis.


- Start protonix IV while patient is NPO; resume PO protonix when patient 

tolerating PO





GI PPx: Protonix, as above


DVT PPx: Heparin





Patient seen, discussed, and reviewed with attending, Dr. Batista





<Gen Batista - Last Filed: 12/27/17 00:14>





Results





- Vital Signs


Recent Vital Signs: 





 Last Vital Signs











Temp  98.3 F   12/26/17 16:08


 


Pulse  72   12/26/17 21:47


 


Resp  18   12/26/17 21:47


 


BP  123/80   12/26/17 21:47


 


Pulse Ox  98   12/26/17 21:47














- Labs


Result Diagrams: 


 12/26/17 16:35





 12/26/17 16:35


Labs: 





 Laboratory Results - last 24 hr











  12/26/17 12/26/17 12/26/17





  21:20 21:35 22:00


 


POC Glucose (mg/dL)  131 H  


 


Troponin I   < 0.01 


 


Urine HCG, Qual    Negative














Attending/Attestation





- Attestation


I have personally seen and examined this patient.: Yes


I have fully participated in the care of the patient.: Yes


I have reviewed all pertinent clinical information: Yes


Notes (Text): 





12/27/17 00:13


Patient was seen when she was in bed # 19 in the ER.


Agree with history ,physical examination, assessment and plan with  following 

impressions.


Intractable abdominal pain.


Nausea/Vomiting.


PNA.


Hypomagnesemia.


Gastroparesis.


Gastritis.


Reflux esophagitis.


History of endoscopy.


Leukocytosis.


IDDM.


HTN.


Osteoporosis.


Anxiety.


Depresson.


History of marijuana use.


Sickle cell trait.


Smoker.


FHx breast cancer.


FHx DM.


FHx Sickle cell disease.

## 2017-12-26 NOTE — US
HISTORY:

nausea/vomiting/epigastric pain



COMPARISON:

None.



TECHNIQUE:

Sonographic evaluation of the right upper quadrant of the abdomen.



FINDINGS:



LIVER:

Measures 16.5 cm in length. Normal echogenicity of the liver 

parenchyma.  No mass. No intrahepatic bile duct dilatation. 



GALLBLADDER:

Unremarkable. No gallstones. 



COMMON BILE DUCT:

Measures 2.6 mm.  No stones. No dilatation.



PANCREAS:

Unremarkable as visualized. No mass. No ductal dilatation.



RIGHT KIDNEY:

Measures 4.9 x 10.8 cm in length. Normal echogenicity. No calculus, 

mass, or hydronephrosis.



AORTA:

No aneurysmal dilatation.



IVC:

Unremarkable.



OTHER FINDINGS:

None .



IMPRESSION:

No significant or acute  findings to account for/ related to the 

clinical presentation.

## 2017-12-26 NOTE — ED PDOC
Arrival/HPI





<Torin Ruff - Last Filed: 12/26/17 20:39>





- General


Historian: Patient





<Erik Carrion - Last Filed: 12/28/17 21:48>





- General


Time Seen by Provider: 12/26/17 15:31





- History of Present Illness


Narrative History of Present Illness (Text): 





12/26/17 15:32


26 y/o female, pmh including htn/gerd/dm/gastroparasis, nkda, c/o abdominal 

pain with nausea and vomiting x 1 day.  Pt. stated that she has generalized 

upper abdominal pain, associated with nausea and vomiting, no fever or chills, 

stated that she feels fatigue, no night sweat, no dizziness, no rash, no night 

sweat, no palpitation, no numbness or tingling, no other medical or 

psychological complaints. 


 (Erik Carrion)





Past Medical History





- Provider Review


Nursing Documentation Reviewed: Yes





- Infectious Disease


Hx of Infectious Diseases: None





- Tetanus Immunization


Tetanus Immunization: Unknown





- Cardiac


Hx Hypertension: Yes





- Pulmonary


Hx Respiratory Disorders: No





- Neurological


Hx Neurological Disorder: No





- HEENT


Hx HEENT Disorder: No





- Renal


Hx Renal Disorder: No





- Endocrine/Metabolic


Hx Diabetes Mellitus Type 1: Yes (dx 9 yrs old)





- Hematological/Oncological


Hx Sickle Cell Disease: Yes ("JUST FOUND OUT A MONTH AGO")





- Integumentary


Hx Dermatological Disorder: No





- Musculoskeletal/Rheumatological


Hx Osteoporosis: Yes (pt unsure of this hx)





- Gastrointestinal


Hx Gastritis: Yes





- Genitourinary/Gynecological


Hx Genitourinary Disorders: No





- Psychiatric


Hx Anxiety: Yes


Hx Depression: Yes


Hx Substance Use: No





- Past Surgical History


Past Surgical History: Non-Contributing





- Anesthesia


Hx Anesthesia: Yes


Hx Anesthesia Reactions: No


Hx Malignant Hyperthermia: No





- Suicidal Assessment


Feels Threatened In Home Enviroment: No





<Erik Carrion - Last Filed: 12/28/17 21:48>





Family/Social History





- Physician Review


Nursing Documentation Reviewed: Yes


Family/Social History: Unknown Family HX


Smoking Status: Light Smoker < 10 Cigarettes Daily


Hx Alcohol Use: No


Hx Substance Use: No


Substance used: weed


Hx Substance Use Treatment: No





<Erik Carrion - Last Filed: 12/28/17 21:48>





Allergies/Home Meds





<Torin Ruff - Last Filed: 12/26/17 20:39>





<Erik Carrion Q - Last Filed: 12/28/17 21:48>


Allergies/Adverse Reactions: 


Allergies





No Known Allergies Allergy (Verified 12/26/17 16:20)


 








Home Medications: 


 Home Meds











 Medication  Instructions  Recorded  Confirmed


 


metFORMIN [glucOPHAGE] 500 mg PO BID 10/19/17 12/26/17


 


Lisinopril 1 tab PO DAILY 12/02/17 12/26/17


 


Protonix 40 mg PO DAILY 12/02/17 12/26/17


 


Ondansetron ODT [Zofran ODT] 4 mg PO PRN PRN 12/26/17 12/26/17














Review of Systems





- Review of Systems


Constitutional: Fatigue.  absent: Fevers


Eyes: absent: Vision Changes


ENT: absent: Hearing Changes


Respiratory: Cough.  absent: SOB


Cardiovascular: absent: Chest Pain


Gastrointestinal: Nausea.  absent: Diarrhea


Musculoskeletal: Myalgias.  absent: Arthralgias, Back Pain, Neck Pain


Skin: absent: Rash, Pruritis


Neurological: absent: Headache, Dizziness


Psychiatric: absent: Anxiety, Depression





<Erik Carrion Q - Last Filed: 12/28/17 21:48>





Physical Exam


Temperature: Afebrile


Blood Pressure: Normal


Pulse: Regular


Respiratory Rate: Normal


Appearance: Positive for: Well-Appearing, Non-Toxic, Comfortable


Pain Distress: None


Mental Status: Positive for: Alert and Oriented X 3





- Systems Exam


Head: Present: Atraumatic, Normocephalic


Pupils: Present: PERRL


Extroacular Muscles: Present: EOMI, Gaze Palsy, Entrapment, Other


Conjunctiva: Present: Normal


Mouth: Present: Moist Mucous Membranes


Neck: Present: Normal Range of Motion


Respiratory/Chest: Present: Clear to Auscultation, Good Air Exchange.  No: 

Respiratory Distress, Accessory Muscle Use


Cardiovascular: Present: Regular Rate and Rhythm, Normal S1, S2.  No: Murmurs


Abdomen: Present: Tenderness (+epigastric and upper abdominal tenderness), 

Normal Bowel Sounds.  No: Distention, Peritoneal Signs, Rebound, Guarding


Back: Present: Normal Inspection.  No: CVA Tenderness, Midline Tenderness


Upper Extremity: Present: Normal Inspection.  No: Cyanosis, Edema


Lower Extremity: Present: Normal Inspection, Normal ROM, Neurovascularly Intact

, Capillary Refill < 2 s.  No: Edema, Deformity


Neurological: Present: GCS=15, Speech Normal, Motor Func Grossly Intact, Memory 

Normal


Skin: Present: Warm, Dry, Normal Color.  No: Rashes


Psychiatric: Present: Alert, Oriented x 3, Normal Insight, Normal Concentration





<Erik Cariron - Last Filed: 12/28/17 21:48>


Vital Signs











  Temp Pulse Resp BP Pulse Ox


 


 12/26/17 21:47   72  18  123/80  98


 


 12/26/17 17:57   63  18  121/72  100


 


 12/26/17 16:08  98.3 F  72  16  153/99 H  100














Medical Decision Making





<Torin Ruff - Last Filed: 12/26/17 20:39>





- Lab Interpretations


I have reviewed the lab results: Yes





- RAD Interpretation


: Radiologist





<Erik Carrion - Last Filed: 12/28/17 21:48>


ED Course and Treatment: 





12/26/17 15:59


-labs/ua


-sonogram


-CT abdomen and pelvis


-IVF/pepcid/reglan/morphine 4mg


-Observe and reassess





12/26/17 16:28


-Urine hcg negative. 


-Pt. still vomiting, benadryl 50mg IV ordered. 





12/26/17 16:31


-Pt. is asking for more pain med, morphine 2mg IV ordered. 





12/26/17 19:28


-Pt. still in pain and wants more meds and antiemetic medications, zofran and 

toradol ordered. 





12/26/17 20:04


-Labs are non-significant except wbc 12.0 (afebrile, likely pain and stress 

induced), Mg 1.4 (mg IV ordered), Lipase is negative


-UA show no UTI


-Sonogram show no significant or acute  findings to account for/ related to the 

clinical presentation.


-CT abdomen and pelvis show probable atelectasis versus a very small infiltrate 

in the right lung base.  Otherwise, no evidence of significant acute process. 

no evidence of acute intraabdominal process.


-Pt. stated that she is coughing lately, will give antibiotic.


-I re-evaluated after multiple rounds of anti-emetics/nsaid/morphine/pepcid, no 

relief, still in pain and vomiting, stated that she is not conformatable to go 

home, will admit for observation.





12/26/17 20:07


-Chest xray ordered.


-I spoke to DR. Batista and the medical resident, discussed about the case and 

discussed my concerning about the patient's condition and fail outpatient 

treatment, will need observation for over night, agreed to start rocephine and 

azithromycin.


-I discussed with Dr. Ruff and he agreed on the observation plan, request 

me to put in admission order. 





 (Erik Carrion)





- Lab Interpretations


Lab Results: 








 12/26/17 16:35 





 12/26/17 16:35 





 Lab Results





12/26/17 16:35: WBC 12.0 H D, RBC 4.53, Hgb 12.4, Hct 35.9 L, MCV 79.2 L, MCH 

27.4, MCHC 34.5, RDW 13.6, Plt Count 278, MPV 9.2, Gran % 82.8 H, Lymph % (Auto

) 11.7 L, Mono % (Auto) 5.1, Eos % (Auto) 0.2 L, Baso % (Auto) 0.2, Gran # 9.97 

H, Lymph # 1.4, Mono # 0.6, Eos # 0.0, Baso # 0.03


12/26/17 16:35: Sodium 145, Potassium 3.7, Chloride 107, Carbon Dioxide 26, 

Anion Gap 16, BUN 10, Creatinine 0.7, Est GFR (African Amer) > 60, Est GFR (Non-

Af Amer) > 60, Random Glucose 157 H, Calcium 9.8, Magnesium 1.4 L, Total 

Bilirubin 0.5, AST 33, ALT 55, Alkaline Phosphatase 50, Total Protein 8.3, 

Albumin 4.8, Globulin 3.5, Albumin/Globulin Ratio 1.4, Lipase 123


12/26/17 16:11: Urine Color Light yellow, Urine Appearance Clear, Urine pH 6.0, 

Ur Specific Gravity 1.020, Urine Protein 30 H, Urine Glucose (UA) Negative, 

Urine Ketones Negative, Urine Blood Small H, Urine Nitrate Negative, Urine 

Bilirubin Negative, Urine Urobilinogen 0.2, Ur Leukocyte Esterase Negative, 

Urine RBC 1 - 3, Urine WBC 0 - 2, Ur Epithelial Cells 0 - 2, Urine Bacteria 

Small











- RAD Interpretation


Radiology Orders: 








12/26/17 15:52


ABD & PELVIS IV CONTRAST ONLY [CT] Stat 


GALLBLADDER & COMMON DUCT [US] Stat 





12/26/17 19:54


CHEST PORTABLE [RAD] Stat 

















Gallbladder sonogram:





HISTORY:


nausea/vomiting/epigastric pain





COMPARISON:


None.





TECHNIQUE:


Sonographic evaluation of the right upper quadrant of the abdomen.





FINDINGS:





LIVER:


Measures 16.5 cm in length. Normal echogenicity of the liver parenchyma.  No 

mass. No intrahepatic bile duct dilatation. 





GALLBLADDER:


Unremarkable. No gallstones. 





COMMON BILE DUCT:


Measures 2.6 mm.  No stones. No dilatation.





PANCREAS:


Unremarkable as visualized. No mass. No ductal dilatation.





RIGHT KIDNEY:


Measures 4.9 x 10.8 cm in length. Normal echogenicity. No calculus, mass, or 

hydronephrosis.





AORTA:


No aneurysmal dilatation.





IVC:


Unremarkable.





OTHER FINDINGS:


None .





IMPRESSION:


No significant or acute  findings to account for/ related to the clinical 

presentation.


--------------------------------------------------------------------------------

--------------------------------------------------------


CT Abdomen and Pelvis:





LOWER THORAX: Small areas of groundglass density in the right lung base, most 

likely


representing dependent atelectasis versus a minimal infiltrate.


ABDOMEN:


LIVER: Area of low density in the liver, abutting the falciform ligament, most 

compatible with focal


fatty infiltration. Hepatomegaly, with the liver measuring 20 cm in length on 

the coronal images.


GALLBLADDER AND BILE DUCTS: No CT evidence of acute cholecystitis. No evidence 

of


significant biliary ductal dilatation.


PANCREAS: No CT evidence of acute pancreatitis.


SPLEEN: No acute abnormality of the spleen identified.


ADRENALS: No acute abnormality of the adrenal glands identified.


ERINN MELCHOR Accession: [id number] MRN:[id number] | Final Radiology 

Report


CONFIDENTIALITY STATEMENT


This report is intended only for use by the referring physician, and only in 

accordance with law. If you received this in error, call [phone].


Page 2 of 2


KIDNEYS AND URETERS: No acute abnormality of the kidneys identified. No 

evidence of


significant hydrouereteronephrosis.


STOMACH AND BOWEL: No acute abnormality of the stomach, small bowel or colon 

identified. No


evidence of bowel obstruction.


APPENDIX: Appendix is seen, and is within normal limits in appearance.


PELVIS:


BLADDER: No acute abnormality of the bladder identified.


REPRODUCTIVE:No acute abnormality of the reproductive organs is seen. No acute 

abnormality of


the uterus identified. No evidence of large adnexal masses.


ABDOMEN and PELVIS:


INTRAPERITONEAL SPACE: Stable appearance of small, linear calcifications in the 

posterior


pelvis, which may be peritoneal in location. This finding is of uncertain 

etiology, however, is


compatible with a nonacute the finding. No evidence of free air or free fluid.


BONES/JOINTS: No acute fractures or other acute bony abnormality noted.


SOFT TISSUES: Postoperative scarring involving the anterior pelvic wall


VASCULATURE: No evidence of abdominal aortic aneurysm. No evidence of periaortic


hemorrhage.


LYMPH NODES: No evidence of diffuse lymphadenopathy.





IMPRESSION:


- Probable atelectasis versus a very small infiltrate in the right lung base.


- Otherwise, no evidence of significant acute process. No evidence of acute 

intraabdominal process.


- See above for remaining findings.





Thank you for allowing us to participate in the care of your patient.


Dictated and Authenticated by: Rosmery Cantu MD


12/26/2017 7:43 PM Eastern Time ([place] & [place])


--------------------------------------------------------------------------------

----------------------------------


CHest xray: no active disease (Erik Carrion)





- Medication Orders


Current Medication Orders: 











Discontinued Medications





Acetaminophen (Tylenol 325mg Tab)  650 mg PO Q6H PRN


   PRN Reason: Fever >100.4 F


Acetaminophen (Tylenol 325mg Tab)  650 mg PO Q6H PRN


   PRN Reason: Pain, Mild (1-3)


   Last Admin: 12/27/17 18:26  Dose: 650 mg





MAR Pain/Vitals


 Document     12/27/17 18:26  EDGAR  (Rec: 12/27/17 18:27  JA  [facility] [id number])


     Pain Reassessment


      Is This A Pain ReAssessment?               No


     Sleep


      Is patient sleeping during reassessment?   No


     Presence of Pain


      Presence of Pain                           Yes


     Pain Scale Used


      Pain Scale Used                            Numeric


     Location


      Intensity                                  8


      Scale Used                                 Numeric





Amitriptyline HCl (Elavil)  10 mg PO ONCE ONE


   Stop: 12/28/17 12:46


   Last Admin: 12/28/17 13:18  Dose: 10 mg





Bisacodyl (Dulcolax)  10 mg RC Q24H PRN


   PRN Reason: constipation


Diphenhydramine HCl (Benadryl)  50 mg IVP STAT STA


   Stop: 12/26/17 16:28


   Last Admin: 12/26/17 16:43  Dose: 50 mg





IVP Administration


 Document     12/26/17 16:43  EQ  (Rec: 12/26/17 16:43  EQ  [id number])


     Charges for Administration


      # of IVP Administrations                   1





Famotidine (Pepcid)  20 mg IVP DAILY [facility]


   Last Admin: 12/26/17 16:43  Dose: 20 mg





IVP Administration


 Document     12/26/17 16:43  EQ  (Rec: 12/26/17 16:43  EQ  [id number])


     Charges for Administration


      # of IVP Administrations                   1





Heparin Sodium (Porcine) (Heparin)  5,000 units SC Q8 LUIS ANTONIO


   PRN Reason: Protocol


   Last Admin: 12/28/17 06:41  Dose: 5,000 units





Subcutaneous Administrations


 Document     12/28/17 06:41  BR  [facility]17 06[facility]41  [id number]


     Charges for Administration


      # of Subcutaneous Administrations          1





Hydromorphone HCl (Dilaudid)  0.5 mg IVP STAT STA


   Stop: 12/28/17 06:28


   Last Admin: 12/28/17 06:41  Dose: 0.5 mg





MAR Pain Assessment


 Document     12/28/17 06:41  BR  (Rec: 12/28/17 06:42  [id number]


     Pain Reassessment


      Is this a pain reassessment?               No


     Sleep


      Is patient sleeping during reassessment?   No


     Presence of Pain


      Presence of Pain                           Yes


IVP Administration


 Document     12/28/17 06:41  BR  (Rec: 12/28/17 06:42  [id number]


     Charges for Administration


      # of IVP Administrations                   1





Sodium Chloride (Sodium Chloride 0.9%)  1,000 mls @ 999 mls/hr IV .Q1H1M STA


   Stop: 12/26/17 16:54


   Last Admin: 12/26/17 16:43  Dose: 999 mls/hr





eMAR Start Stop


 Document     12/26/17 16:43  EQ  (Rec: 12/26/17 16:43  EQ  [id number])


     Intravenous Solution


      Start Date                                 12/26/17


      Start Time                                 16:43





Magnesium Sulfate/Dextrose (Magnesium Sulfate 1 Gm/100 Ml D5w)  1 gm in 100 mls 

@ 100 mls/hr IVPB ONCE ONE


   Stop: 12/26/17 18:14


   Last Admin: 12/26/17 18:00  Dose: 100 mls/hr





eMAR Start Stop


 Document     12/26/17 18:00  EQ  (Rec: 12/26/17 18:29  EQ  [id number])


     Intravenous Solution


      Start Date                                 12/26/17


      Start Time                                 18:00





Ceftriaxone Sodium (Rocephin 1 Gram Ivpb)  1 gm in 100 mls @ 200 mls/hr IVPB 

STAT STA


   PRN Reason: Protocol


   Stop: 12/26/17 20:28


   Last Admin: 12/26/17 20:50  Dose: 200 mls/hr





eMAR Start Stop


 Document     12/26/17 20:50  EQ  (Rec: 12/26/17 20:50  EQ  [id number])


     Intravenous Solution


      Start Date                                 12/26/17


      Start Time                                 20:50





Azithromycin (Zithromax 500mg In Ns)  500 mg in 250 mls @ 167 mls/hr IVPB STAT 

STA


   PRN Reason: Protocol


   Stop: 12/26/17 21:28


   Last Admin: 12/26/17 21:14  Dose: 167 mls/hr





eMAR Start Stop


 Document     12/26/17 21:14  EQ  (Rec: 12/26/17 21:14  EQ  [id number])


     Intravenous Solution


      Start Date                                 12/26/17


      Start Time                                 21:14





Sodium Chloride (Sodium Chloride 0.9%)  1,000 mls @ 100 mls/hr IV .Q10H [facility]


   Last Admin: 12/28/17 06:40  Dose: 100 mls/hr





eMAR Start Stop


 Document     12/28/17 06:40  BR  (Rec: 12/28/17 06:40  BR  [id number])


     Intravenous Solution


      Start Date                                 12/28/17


      Start Time                                 06:40





Ceftriaxone Sodium 1,000 mg/ (Sodium Chloride)  100 mls @ 100 mls/hr IVPB Q24H 

LUIS ANTONIO


   PRN Reason: Protocol


   Stop: 01/01/18 10:01


   Last Admin: 12/28/17 09:32  Dose: 100 mls/hr





eMAR Start Stop


 Document     12/28/17 09:32  YJ  (Rec: 12/28/17 09:32  [id number]


     Intravenous Solution


      Start Date                                 12/28/17


      Start Time                                 09:32


      End Date                                   12/28/17


      End time                                   10:32


      Total Infusion Time                        60





Azithromycin (Zithromax 500mg In Ns)  500 mg in 250 mls @ 167 mls/hr IVPB DAILY 

LUIS ANTONIO


   PRN Reason: Protocol


   Stop: 01/01/18 10:01


   Last Admin: 12/28/17 09:32  Dose: 167 mls/hr





eMAR Start Stop


 Document     12/28/17 09:32  Y  (Rec: 12/28/17 09:32  [id number]


     Intravenous Solution


      Start Date                                 12/28/17


      Start Time                                 10:32


      End Date                                   12/28/17


      End time                                   12:02


      Total Infusion Time                        90





Insulin Human Regular (Humulin R Low)  0 units SC ACHS LUIS ANTONIO


   PRN Reason: Protocol


   Last Admin: 12/28/17 12:03 Dose:  Not Given


   Non-Admin Reason: Blood Sugar Parameter





MAR Blood Glucose


 Document     12/28/17 12:03  Y  (Rec: 12/28/17 12:03  [id number]


     Blood Glucose


      Finger Stick Blood Glucose ()        133





Ketorolac Tromethamine (Toradol)  30 mg IVP STAT STA


   Stop: 12/26/17 19:29


   Last Admin: 12/26/17 19:44  Dose: 30 mg





MAR Pain Assessment


 Document     12/26/17 19:44  EQ  (Rec: 12/26/17 19:44  EQ  [id number])


     Pain Reassessment


      Is this a pain reassessment?               No


     Sleep


      Is patient sleeping during reassessment?   No


     Presence of Pain


      Presence of Pain                           Yes


     Pain Scale Used


      Pain Scale Used                            Numeric


IVP Administration


 Document     12/26/17 19:44  EQ  (Rec: 12/26/17 19:44  EQ  [id number])


     Charges for Administration


      # of IVP Administrations                   1





Lisinopril (Zestril)  10 mg PO DAILY LUIS ANTONIO


   Last Admin: 12/28/17 09:32  Dose: 10 mg





MAR Pulse and Blood Pressure


 Document     12/28/17 09:32  YJ  (Rec: 12/28/17 09:33  YJ  [id number])


     Pulse


      Pulse Rate (60-90)                         75


     Blood Pressure


      Blood Pressure (100//90)             132/86





Metoclopramide HCl (Reglan)  10 mg IVP STAT STA


   Stop: 12/26/17 15:53


   Last Admin: 12/26/17 16:42  Dose: 10 mg





IVP Administration


 Document     12/26/17 16:42  EQ  (Rec: 12/26/17 16:43  EQ  [id number])


     Charges for Administration


      # of IVP Administrations                   1





Metoclopramide HCl (Reglan)  5 mg IVP Q6H PRN


   PRN Reason: Nausea


   Last Admin: 12/27/17 04:13  Dose: 5 mg





IVP Administration


 Document     12/27/17 04:13  MAD  (Rec: 12/27/17 04:12  MAD  [id number])


     Charges for Administration


      # of IVP Administrations                   1





Metoclopramide HCl (Reglan)  5 mg IVP ONCE STA


   Stop: 12/27/17 07:58


   Last Admin: 12/27/17 08:27  Dose: 5 mg





IVP Administration


 Document     12/27/17 08:27  JA  (Rec: 12/27/17 08:27  JA  [id number])


     Charges for Administration


      # of IVP Administrations                   1





Mineral Oil (Fleet Mineral Oil Enema)  135 ml RC ONCE ONE


   Stop: 12/28/17 10:29


   Last Admin: 12/28/17 11:52  Dose: 135 ml





Morphine Sulfate (Morphine)  4 mg IVP STAT STA


   Stop: 12/26/17 15:58


   Last Admin: 12/26/17 16:43  Dose: 4 mg





MAR Pain Assessment


 Document     12/26/17 16:43  EQ  (Rec: 12/26/17 16:43  EQ  [id number])


     Pain Reassessment


      Is this a pain reassessment?               No


     Sleep


      Is patient sleeping during reassessment?   No


     Presence of Pain


      Presence of Pain                           Yes


     Pain Scale Used


      Pain Scale Used                            Numeric


IVP Administration


 Document     12/26/17 16:43  EQ  (Rec: 12/26/17 16:43  EQ  [id number])


     Charges for Administration


      # of IVP Administrations                   1





Morphine Sulfate (Morphine)  2 mg IVP STAT STA


   Stop: 12/26/17 16:31


   Last Admin: 12/26/17 17:09  Dose: 2 mg





MAR Pain Assessment


 Document     12/26/17 17:09  EQ  (Rec: 12/26/17 17:09  EQ  [id number])


     Pain Reassessment


      Is this a pain reassessment?               No


     Sleep


      Is patient sleeping during reassessment?   No


     Presence of Pain


      Presence of Pain                           Yes


IVP Administration


 Document     12/26/17 17:09  EQ  (Rec: 12/26/17 17:09  EQ  [id number])


     Charges for Administration


      # of IVP Administrations                   1





Morphine Sulfate (Morphine)  2 mg IVP Q6H PRN


   PRN Reason: Pain, moderate (4-7)


   Last Admin: 12/27/17 12:30  Dose: 2 mg





MAR Pain Assessment


 Document     12/27/17 12:30  JA  (Rec: 12/27/17 12:31  [id number])


     Pain Reassessment


      Is this a pain reassessment?               No


     Presence of Pain


      Presence of Pain                           No


     Pain Scale Used


      Pain Scale Used                            Numeric


     Location


      Pain Location Body Site                    Abdomen


                                                 Hand


                                                 Leg


     Description


      Description                                Constant


      Intensity of Pain at present               10


      Pain Behavior                              Moaning


                                                 Crying


                                                 Restlessness


                                                 Facial Grimacing


      Aggravating Factors                        ADL's


                                                 Changing Position


      Alleviating Factors/Management             Medication


       Techniques                                


      Alleviating Factors                        Medication


IVP Administration


 Document     12/27/17 12:30  JA  (Rec: 12/27/17 12:31  [id number])


     Charges for Administration


      # of IVP Administrations                   1


Re-Assess: MAR Pain Assessment


 Document     12/27/17 13:30  JA  (Rec: 12/27/17 14:24  [id number])


     Pain Reassessment


      Is this a pain reassessment?               Yes


     Sleep


      Is patient sleeping during reassessment?   No


     Presence of Pain


      Presence of Pain                           No


     Pain Scale Used


      Pain Scale Used                            Numeric


     Description


      Intensity of Pain at present               0


      Acceptable Level of Pain                   0-2


      Alleviating Factors/Management             Medication


       Techniques                                





Ondansetron HCl (Zofran Inj)  4 mg IVP STAT STA


   Stop: 12/26/17 19:29


   Last Admin: 12/26/17 19:44  Dose: 4 mg





IVP Administration


 Document     12/26/17 19:44  EQ  (Rec: 12/26/17 19:44  EQ  [id number])


     Charges for Administration


      # of IVP Administrations                   1





Ondansetron HCl (Zofran Inj)  4 mg IVP Q4H PRN


   PRN Reason: Nausea/Vomiting


   Last Admin: 12/28/17 04:29  Dose: 4 mg





IVP Administration


 Document     12/28/17 04:29  BR  (Rec: 12/28/17 04:29  BR  [id number])


     Charges for Administration


      # of IVP Administrations                   1





Pantoprazole Sodium (Protonix Inj)  40 mg IVP DAILY [facility]


   Last Admin: 12/28/17 09:32  Dose: 40 mg





IVP Administration


 Document     12/28/17 09:32  YJ  (Rec: 12/28/17 09:32  YJ  [facility] [id number]


     Charges for Administration


      # of IVP Administrations                   1





Polyethylene Glycol (Miralax)  17 gm PO STAT STA


   Stop: 12/26/17 21:20


   Last Admin: 12/27/17 01:51  Dose: 17 gm





Polyethylene Glycol (Miralax)  17 gm PO BID [facility]


   Last Admin: 12/28/17 11:52  Dose: 17 gm





Promethazine HCl (Phenergan Inj)  50 mg IM ONCE ONE


   Stop: 12/27/17 10:45


   Last Admin: 12/27/17 11:17  Dose: 50 mg





IM Administration Charges


 Document     12/27/17 11:17  JA  (Rec: 12/27/17 11:17  JA  [facility] [id number][place]


     Injection Site


      MAR Injection Site                         Left Deltoid


     Charges for Administration


      # of IM Administrations                    1





Promethazine HCl (Phenergan Inj)  25 mg IM Q6 [facility]


   Last Admin: 12/28/17 11:52  Dose: 25 mg





IM Administration Charges


 Document     12/28/17 11:52  YJ  (Rec: 12/28/17 11:53  YJ  [facility] [id number]


     Injection Site


      MAR Injection Site                         Right Deltoid


     Charges for Administration


      # of IM Administrations                    1














- PA / NP / Resident Statement


SELVIN has reviewed & agrees with the documentation as recorded.


SELVIN has examined the patient and agrees with the treatment plan.





<Torin Ruff - Last Filed: 12/26/17 20:39>





- PA / NP / Resident Statement


MD/DO has reviewed & agrees with the documentation as recorded.





<Erik Carrion - Last Filed: 12/28/17 21:48>





Disposition/Present on Arrival





<Torin Ruff - Last Filed: 12/26/17 20:39>





- Present on Arrival


Any Indicators Present on Arrival: Yes


History of DVT/PE: No


History of Uncontrolled Diabetes: Yes


Urinary Catheter: No


History Surgical Site Infection Following: None





- Disposition


Have Diagnosis and Disposition been Completed?: Yes


Disposition Time: 20:09


Patient Plan: Observation





<Erik Carrion - Last Filed: 12/28/17 21:48>





- Disposition


Diagnosis: 


 Persistent vomiting, Intractable abdominal pain, Pneumonia





Disposition: HOSPITALIZED


Condition: STABLE

## 2017-12-26 NOTE — CT
EXAM:

  CT Abdomen and Pelvis With Intravenous Contrast



EXAM DATE/TIME:

  12/26/2017 3:52 PM



CLINICAL HISTORY:

  27 years old, female; Pain; Abdominal pain; Acute; Additional info: 

Nausea/vomiting/abdominal pain, h/o gastroparaesis



TECHNIQUE:

  Axial computed tomography images of the abdomen and pelvis with intravenous 

contrast.  All CT scans at this facility use one or more dose reduction 

techniques, viz.: automated exposure control; ma/kV adjustment per patient size 

(including targeted exams where dose is matched to indication; i.e. head); or 

iterative reconstruction technique.

  Coronal and sagittal reformatted images were created and reviewed.



CONTRAST:

  100 mL of omni 350 administered intravenously.



COMPARISON:

   Prior CT abdomen and pelvis of 4/14/2016.



 FINDINGS:

  LOWER THORAX:  Small areas of groundglass density in the right lung base, 

most likely representing dependent atelectasis versus a minimal infiltrate.



 ABDOMEN:

  LIVER:  Area of low density in the liver, abutting the falciform ligament, 

most compatible with focal fatty infiltration.  Hepatomegaly, with the liver 

measuring 20 cm in length on the coronal images.

  GALLBLADDER AND BILE DUCTS:  No CT evidence of acute cholecystitis.  No 

evidence of significant biliary ductal dilatation.

  PANCREAS:  No CT evidence of acute pancreatitis.

  SPLEEN:  No acute abnormality of the spleen identified.

  ADRENALS:  No acute abnormality of the adrenal glands identified.

  KIDNEYS AND URETERS:  No acute abnormality of the kidneys identified. No 

evidence of significant hydrouereteronephrosis.

  STOMACH AND BOWEL:  No acute abnormality of the stomach, small bowel or colon 

identified. No evidence of bowel obstruction.

  APPENDIX:  Appendix is seen, and is within normal limits in appearance.



 PELVIS:

  BLADDER:  No acute abnormality of the bladder identified.

  REPRODUCTIVE:No acute abnormality of the reproductive organs is seen.  No 

acute abnormality of the uterus identified.  No evidence of large adnexal 

masses.



 ABDOMEN and PELVIS:

  INTRAPERITONEAL SPACE:  Stable appearance of small, linear calcifications in 

the posterior pelvis, which may be peritoneal in location. This finding is of 

uncertain etiology, however, is compatible with a nonacute the finding.  No 

evidence of free air or free fluid.  

  BONES/JOINTS:  No acute fractures or other acute bony abnormality noted.

  SOFT TISSUES:  Postoperative scarring involving the anterior pelvic wall

  VASCULATURE:  No evidence of abdominal aortic aneurysm. No evidence of 

periaortic hemorrhage.

  LYMPH NODES:  No evidence of diffuse lymphadenopathy.



IMPRESSION:     

- Probable atelectasis versus a very small infiltrate in the right lung base.

- Otherwise, no evidence of significant acute process. No evidence of acute 

intraabdominal process.

- See above for remaining findings.

## 2017-12-27 VITALS — RESPIRATION RATE: 20 BRPM

## 2017-12-27 LAB
ALBUMIN SERPL-MCNC: 4.5 G/DL (ref 3–4.8)
ALBUMIN/GLOB SERPL: 1.5 {RATIO} (ref 1.1–1.8)
ALT SERPL-CCNC: 46 U/L (ref 7–56)
AST SERPL-CCNC: 31 U/L (ref 14–36)
BASOPHILS # BLD AUTO: 0.02 K/MM3 (ref 0–2)
BASOPHILS NFR BLD: 0.2 % (ref 0–3)
BUN SERPL-MCNC: 11 MG/DL (ref 7–21)
CALCIUM SERPL-MCNC: 9.3 MG/DL (ref 8.4–10.5)
EOSINOPHIL # BLD: 0 10*3/UL (ref 0–0.7)
EOSINOPHIL NFR BLD: 0.1 % (ref 1.5–5)
ERYTHROCYTE [DISTWIDTH] IN BLOOD BY AUTOMATED COUNT: 13.6 % (ref 11.5–14.5)
GFR NON-AFRICAN AMERICAN: > 60
GRANULOCYTES # BLD: 8.52 10*3/UL (ref 1.4–6.5)
GRANULOCYTES NFR BLD: 80.9 % (ref 50–68)
HGB BLD-MCNC: 11.7 G/DL (ref 12–16)
LYMPHOCYTES # BLD: 1.5 10*3/UL (ref 1.2–3.4)
LYMPHOCYTES NFR BLD AUTO: 14.5 % (ref 22–35)
MAGNESIUM SERPL-MCNC: 1.8 MG/DL (ref 1.7–2.2)
MCH RBC QN AUTO: 26.8 PG (ref 25–35)
MCHC RBC AUTO-ENTMCNC: 33.7 G/DL (ref 31–37)
MCV RBC AUTO: 79.4 FL (ref 80–105)
MONOCYTES # BLD AUTO: 0.5 10*3/UL (ref 0.1–0.6)
MONOCYTES NFR BLD: 4.3 % (ref 1–6)
PLATELET # BLD: 264 10^3/UL (ref 120–450)
PMV BLD AUTO: 9.8 FL (ref 7–11)
RBC # BLD AUTO: 4.37 10^6/UL (ref 3.5–6.1)
WBC # BLD AUTO: 10.5 10^3/UL (ref 4.5–11)

## 2017-12-27 RX ADMIN — INSULIN HUMAN SCH UNITS: 100 INJECTION, SOLUTION PARENTERAL at 12:28

## 2017-12-27 RX ADMIN — MORPHINE SULFATE PRN MG: 2 INJECTION, SOLUTION INTRAMUSCULAR; INTRAVENOUS at 12:30

## 2017-12-27 RX ADMIN — INSULIN HUMAN SCH: 100 INJECTION, SOLUTION PARENTERAL at 01:32

## 2017-12-27 RX ADMIN — AZITHROMYCIN DIHYDRATE SCH MLS/HR: 500 INJECTION, POWDER, LYOPHILIZED, FOR SOLUTION INTRAVENOUS at 11:16

## 2017-12-27 RX ADMIN — INSULIN HUMAN SCH: 100 INJECTION, SOLUTION PARENTERAL at 16:44

## 2017-12-27 RX ADMIN — INSULIN HUMAN SCH: 100 INJECTION, SOLUTION PARENTERAL at 08:13

## 2017-12-27 RX ADMIN — MORPHINE SULFATE PRN MG: 2 INJECTION, SOLUTION INTRAMUSCULAR; INTRAVENOUS at 05:54

## 2017-12-27 NOTE — CP.PCM.PN
<Candelario Martinez - Last Filed: 12/27/17 14:52>





Subjective





- Date & Time of Evaluation


Date of Evaluation: 12/27/17


Time of Evaluation: 07:30





- Subjective


Subjective: 





Patient seen and examined at bedside with complaints of severe nausea and body 

pain. Patient states she wants dilaudid for pain and phenergan for nausea.  

Admits to cough, fevers, nausea, vomiting. Denies shortness of breath.





Objective





- Vital Signs/Intake and Output


Vital Signs (last 24 hours): 


 











Temp Pulse Resp BP Pulse Ox


 


 98.6 F   61   20   128/82   98 


 


 12/27/17 08:32  12/27/17 10:25  12/27/17 08:32  12/27/17 10:25  12/27/17 08:32











- Medications


Medications: 


 Current Medications





Acetaminophen (Tylenol 325mg Tab)  650 mg PO Q6H PRN


   PRN Reason: Pain, Mild (1-3)


   Last Admin: 12/27/17 04:14 Dose:  650 mg


Bisacodyl (Dulcolax)  10 mg RC Q24H PRN


   PRN Reason: constipation


Heparin Sodium (Porcine) (Heparin)  5,000 units SC Q8 [facility]


   PRN Reason: Protocol


   Last Admin: 12/27/17 14:16 Dose:  5,000 units


Sodium Chloride (Sodium Chloride 0.9%)  1,000 mls @ 100 mls/hr IV .Q10H [facility]


   Last Admin: 12/27/17 08:27 Dose:  100 mls/hr


Ceftriaxone Sodium 1,000 mg/ (Sodium Chloride)  100 mls @ 100 mls/hr IVPB Q24H 

[facility]


   PRN Reason: Protocol


   Stop: 01/01/18 10:01


   Last Admin: 12/27/17 10:24 Dose:  100 mls/hr


Azithromycin (Zithromax 500mg In Ns)  500 mg in 250 mls @ 167 mls/hr IVPB DAILY 

[facility]


   PRN Reason: Protocol


   Stop: 01/01/18 10:01


   Last Admin: 12/27/17 11:16 Dose:  167 mls/hr


Insulin Human Regular (Humulin R Low)  0 units SC ACHS [facility]


   PRN Reason: Protocol


   Last Admin: 12/27/17 12:28 Dose:  1 units


Lisinopril (Zestril)  10 mg PO DAILY [facility]


   Last Admin: 12/27/17 10:25 Dose:  10 mg


Ondansetron HCl (Zofran Inj)  4 mg IVP Q4H PRN


   PRN Reason: Nausea/Vomiting


   Last Admin: 12/27/17 01:46 Dose:  4 mg


Pantoprazole Sodium (Protonix Inj)  40 mg IVP DAILY [facility]


   Last Admin: 12/27/17 10:26 Dose:  40 mg


Promethazine HCl (Phenergan Inj)  25 mg IM Q6 [facility]











- Labs


Labs: 


 





 12/27/17 06:30 





 12/27/17 06:30 











- Constitutional


Appears: Non-toxic, No Acute Distress





- Head Exam


Head Exam: ATRAUMATIC, NORMAL INSPECTION, NORMOCEPHALIC





- Eye Exam


Eye Exam: EOMI, Normal appearance





- ENT Exam


ENT Exam: Mucous Membranes Moist, Normal Exam





- Respiratory Exam


Respiratory Exam: Clear to Ausculation Bilateral, NORMAL BREATHING PATTERN.  

absent: Wheezes





- Cardiovascular Exam


Cardiovascular Exam: REGULAR RHYTHM, +S1, +S2





- GI/Abdominal Exam


GI & Abdominal Exam: Soft, Normal Bowel Sounds





- Back Exam


Back Exam: NORMAL INSPECTION





- Neurological Exam


Neurological Exam: Alert, Awake, Oriented x3





- Psychiatric Exam


Psychiatric exam: Normal Affect, Normal Mood





- Skin


Skin Exam: Intact, Normal Color, Warm





Assessment and Plan





- Assessment and Plan (Free Text)


Assessment: 


26 yo F with PMH of DM type I, GERD, gastroparesis, HTN, and sickle cell trait 

presents to ER complaining of unrelenting abdominal pain, nausea, and vomiting 

with inability to tolerate PO for the past three days, though she did eat soup 

today. Has presented to the ER multiple times in the past for the same 

complaints. Also noted to be complaining of cough, and constipation. 








Plan: 





Intractable nausea, vomiting, and abdominal pain


- Likely 2/2 gastroparesis vs gastritis vs GERD vs hormonal contraceptive 

adverse effect


- Diabetic female; may present with atypical chest pain; ordered trop and EKG; 

negative


- Symptoms did not improve significantly with initial symptomatic treatment in 

the ER


- CT abdomen/pelvis shows no evidence of acute intraabdominal findings; Abd US 

unremarkable


- Moderately hyperglycemic on admission; pt reports compliance and good 

glycemic control at home


- HgbA1C 5.6


- Phenergan for nause PRN


- IVF hydration NS @100cc/hr


- GI consulted; recommends liquid diet and advancement trial





Possible Pneumonia


- Patient complaining of productive cough, with leukocytosis


- CT abdomen/pelvis shows evidence of possible atelectasis vs. small infiltrate 

in right lung base


- Continue rocephin and zithromax





Constipation


- Patient reports chronic constipation, requiring manual disimpaction twice 

daily


- Dulcolax suppository PRN for constipation 





Diabetes, type I


- Patient diagnosed with type I DM 18 years ago; reports compliance with home 

medications and good glycemic control


- hgbA1c 5.6


- Start SSI Low with accucheck qHS





HTN


- BP well controlled, continue to monitor


- Lsinopril; hold for SBP <120, DBP <80





GERD


-EGD in 2/2016 with Dr. Luu revealed LA grade A reflux esophagitis, 

gastroparesis, and gastritis.


- Protonix IV 








GI PPx: Protonix


DVT PPx: Heparin





Patient seen, discussed, and reviewed with attending, Dr. Urbina





<Zakiya Urbina - Last Filed: 12/29/17 18:37>





Objective





- Vital Signs/Intake and Output


Vital Signs (last 24 hours): 


 











Temp Pulse Resp BP Pulse Ox


 


 98.5 F   75   20   132/86   97 


 


 12/28/17 07:30  12/28/17 09:32  12/28/17 07:30  12/28/17 09:32  12/28/17 07:30











- Labs


Labs: 


 





 12/28/17 07:00 





 12/28/17 07:00 











Attending/Attestation





- Attestation


I have personally seen and examined this patient.: Yes


I have fully participated in the care of the patient.: Yes


I have reviewed all pertinent clinical information, including history, physical 

exam and plan: Yes


Notes (Text): 


I have seen and examined the patient at bedside. Agree with the above note with 

the following additions/ exceptions: Briefly this is 27 year old female with 

history of DM-2, GERD, gastoparesis, HTN , sickle cell trait, and history of 

recurrent bouts of vomiting with intervening periods of normal health who was 

admitted for evaluation of an episode of nausea, vomiting and inability to 

tolerate per oral. Patient reports that these symptoms occur almost every month 

around her menstrual cycle. Patient reports that she has dm however her hba1c 

even on prior visits have been in normal range. Patient reports taking 

metformin once a day most of the time. She had egd in the past suggestive of 

gastroparesis. I have advised patient to follow up in motility clinic in [facility]. 


Today patient continues to vomit most likely due to gastroparesis which can be 

narcotic induced vs hormonal vs cyclical vomiting syndrome. All imaging 

findings reviewed. Start phenargan. Counselling provided regarding narcotics 

and marijuana use. Start dulcolax. GI consult appreciated. Upon discharge 

patient will follow up with Dr Mane and Dr Doshi.


Dr Zakiya Urbina

## 2017-12-27 NOTE — RAD
HISTORY:

medical clearance  



COMPARISON:

No prior. 



FINDINGS:



LUNGS:

No active pulmonary disease.



PLEURA:

No significant pleural effusion identified, no pneumothorax apparent.



CARDIOVASCULAR:

Normal.



OSSEOUS STRUCTURES:

No significant abnormalities.



VISUALIZED UPPER ABDOMEN:

Normal.



OTHER FINDINGS:

None.



IMPRESSION:

No active disease.

## 2017-12-27 NOTE — CP.PCM.CON
<Leydi Gonzalez - Last Filed: 12/27/17 14:44>





History of Present Illness





- History of Present Illness


History of Present Illness: 


PGY4 Initial GI Consult 





Arnoldo Arthur is a 25 year old female with past history of DM with known 

gastroparesis, sickle cell, who presents to hospital with generalized abdominal 

pain with nausea, vomiting for the past few days.    She is seen this morning 

ambulating in hallway, appears comfortable.  She describes a generalized 8/10 

intensity abdominal pain, non-radiating along with associated nausea and non-

bloody emesis.  She has had a loss of appetite but denies fever/chills, diarrhea

, rectal bleeding, weight loss, or change in bowel habits.  She had an EGD with 

Dr. Luu in February 2016 which showed retained food content suggestive of 

gastroparesis and esophagitis (biopsies unremarkable).  CT abd revealed no sig 

findings except stool burden.  She is asking for additional pain medication. 

She notes having one 1 Bm every 3 days and states that she often has to 

manually disimpact herself. She notes generic stool softner use but does not 

recall its name. Denies any melena, hematemesis, or coffee-ground emesis





Social history: smokes 1/2 PPD cigarettes, no ETOH use


Family history: non-contributory


Endo hx: EGD 2/2016: gastroparesis





ROS: 12 point ROS conducted neg other than above











Past Patient History





- Infectious Disease


Hx of Infectious Diseases: None





- Tetanus Immunizations


Tetanus Immunization: Unknown





- Past Medical History & Family History


Past Medical History?: Yes





- Past Social History


Smoking Status: cannabis





- CARDIAC


Hx Hypertension: Yes





- PULMONARY


Hx Respiratory Disorders: No





- NEUROLOGICAL


Hx Neurological Disorder: No





- HEENT


Hx HEENT Problems: No





- RENAL


Hx Chronic Kidney Disease: No





- ENDOCRINE/METABOLIC


Hx Diabetes Mellitus Type 1: Yes (dx 9 yrs old)





- HEMATOLOGICAL/ONCOLOGICAL


Hx Human Immunodeficiency Virus (HIV): No


Hx Sickle Cell Disease: Yes ("JUST FOUND OUT A MONTH AGO")





- INTEGUMENTARY


Hx Dermatological Problems: No





- MUSCULOSKELETAL/RHEUMATOLOGICAL


Hx Falls: No





- GASTROINTESTINAL


Other/Comment: Gastritis





- GENITOURINARY/GYNECOLOGICAL


Hx Genitourinary Disorders: No





- PSYCHIATRIC


Hx Substance Use: No





- SURGICAL HISTORY


Hx Surgeries: No





- ANESTHESIA


Hx Anesthesia: Yes


Hx Anesthesia Reactions: No


Hx Malignant Hyperthermia: No





Meds


Allergies/Adverse Reactions: 


 Allergies











Allergy/AdvReac Type Severity Reaction Status Date / Time


 


No Known Allergies Allergy   Verified 12/26/17 16:20














- Medications


Medications: 


 Current Medications





Acetaminophen (Tylenol 325mg Tab)  650 mg PO Q6H PRN


   PRN Reason: Pain, Mild (1-3)


   Last Admin: 12/27/17 04:14 Dose:  650 mg


Bisacodyl (Dulcolax)  10 mg RC Q24H PRN


   PRN Reason: constipation


Heparin Sodium (Porcine) (Heparin)  5,000 units SC Q8 [facility]


   PRN Reason: Protocol


   Last Admin: 12/27/17 05:59 Dose:  Not Given


Sodium Chloride (Sodium Chloride 0.9%)  1,000 mls @ 100 mls/hr IV .Q10H [facility]


   Last Admin: 12/27/17 08:27 Dose:  100 mls/hr


Ceftriaxone Sodium 1,000 mg/ (Sodium Chloride)  100 mls @ 100 mls/hr IVPB Q24H 

[facility]


   PRN Reason: Protocol


   Stop: 01/01/18 10:01


   Last Admin: 12/27/17 10:24 Dose:  100 mls/hr


Azithromycin (Zithromax 500mg In Ns)  500 mg in 250 mls @ 167 mls/hr IVPB DAILY 

[facility]


   PRN Reason: Protocol


   Stop: 01/01/18 10:01


   Last Admin: 12/27/17 11:16 Dose:  167 mls/hr


Insulin Human Regular (Humulin R Low)  0 units SC ACHS [facility]


   PRN Reason: Protocol


   Last Admin: 12/27/17 08:13 Dose:  Not Given


Lisinopril (Zestril)  10 mg PO DAILY [facility]


   Last Admin: 12/27/17 10:25 Dose:  10 mg


Morphine Sulfate (Morphine)  2 mg IVP Q6H PRN


   PRN Reason: Pain, moderate (4-7)


   Last Admin: 12/27/17 05:54 Dose:  2 mg


Ondansetron HCl (Zofran Inj)  4 mg IVP Q4H PRN


   PRN Reason: Nausea/Vomiting


   Last Admin: 12/27/17 01:46 Dose:  4 mg


Pantoprazole Sodium (Protonix Inj)  40 mg IVP DAILY [facility]


   Last Admin: 12/27/17 10:26 Dose:  40 mg











Physical Exam





- Constitutional


Appears: Well, No Acute Distress





- Head Exam


Head Exam: ATRAUMATIC, NORMOCEPHALIC





- Eye Exam


Eye Exam: Normal appearance





- ENT Exam


ENT Exam: Mucous Membranes Moist





- Respiratory Exam


Respiratory Exam: Clear to Auscultation Bilateral, NORMAL BREATHING PATTERN.  

absent: Prolonged Expiratory Phase, Rales, Rhonchi, Wheezes, Respiratory 

Distress





- Cardiovascular Exam


Cardiovascular Exam: REGULAR RHYTHM, +S1, +S2





- GI/Abdominal Exam


GI & Abdominal Exam: Normal Bowel Sounds, Soft, Tenderness (lower quad B/L).  

absent: Distended, Firm, Guarding





- Extremities Exam


Extremities exam: Negative for: joint swelling, pedal edema





- Neurological Exam


Neurological exam: Alert, Oriented x3





- Psychiatric Exam


Psychiatric exam: Normal Affect, Normal Mood





- Skin


Skin Exam: Dry, Intact, Normal Color, Warm





Results





- Vital Signs


Recent Vital Signs: 


 Last Vital Signs











Temp  98.6 F   12/27/17 08:32


 


Pulse  61   12/27/17 10:25


 


Resp  20   12/27/17 08:32


 


BP  128/82   12/27/17 10:25


 


Pulse Ox  98   12/27/17 08:32














- Labs


Result Diagrams: 


 12/27/17 06:30





 12/27/17 06:30


Labs: 


 Laboratory Results - last 24 hr











  12/26/17 12/26/17 12/26/17





  21:20 21:35 22:00


 


WBC   


 


RBC   


 


Hgb   


 


Hct   


 


MCV   


 


MCH   


 


MCHC   


 


RDW   


 


Plt Count   


 


MPV   


 


Gran %   


 


Lymph % (Auto)   


 


Mono % (Auto)   


 


Eos % (Auto)   


 


Baso % (Auto)   


 


Gran #   


 


Lymph #   


 


Mono #   


 


Eos #   


 


Baso #   


 


Sodium   


 


Potassium   


 


Chloride   


 


Carbon Dioxide   


 


Anion Gap   


 


BUN   


 


Creatinine   


 


Est GFR ( Amer)   


 


Est GFR (Non-Af Amer)   


 


POC Glucose (mg/dL)  131 H  


 


Random Glucose   


 


Hemoglobin A1c   


 


Calcium   


 


Phosphorus   


 


Magnesium   


 


Total Bilirubin   


 


AST   


 


ALT   


 


Alkaline Phosphatase   


 


Troponin I   < 0.01 


 


Total Protein   


 


Albumin   


 


Globulin   


 


Albumin/Globulin Ratio   


 


Urine HCG, Qual    Negative


 


Urine Opiates Screen   


 


Urine Methadone Screen   


 


Ur Barbiturates Screen   


 


Ur Phencyclidine Scrn   


 


Ur Amphetamines Screen   


 


U Benzodiazepines Scrn   


 


U Oth Cocaine Metabols   


 


U Cannabinoids Screen   


 


Alcohol, Quantitative   














  12/26/17 12/27/17 12/27/17





  22:10 03:15 06:30


 


WBC   


 


RBC   


 


Hgb   


 


Hct   


 


MCV   


 


MCH   


 


MCHC   


 


RDW   


 


Plt Count   


 


MPV   


 


Gran %   


 


Lymph % (Auto)   


 


Mono % (Auto)   


 


Eos % (Auto)   


 


Baso % (Auto)   


 


Gran #   


 


Lymph #   


 


Mono #   


 


Eos #   


 


Baso #   


 


Sodium   


 


Potassium   


 


Chloride   


 


Carbon Dioxide   


 


Anion Gap   


 


BUN   


 


Creatinine   


 


Est GFR ( Amer)   


 


Est GFR (Non-Af Amer)   


 


POC Glucose (mg/dL)   


 


Random Glucose   


 


Hemoglobin A1c  5.6  


 


Calcium   


 


Phosphorus   


 


Magnesium   


 


Total Bilirubin   


 


AST   


 


ALT   


 


Alkaline Phosphatase   


 


Troponin I   


 


Total Protein   


 


Albumin   


 


Globulin   


 


Albumin/Globulin Ratio   


 


Urine HCG, Qual   


 


Urine Opiates Screen   Positive H 


 


Urine Methadone Screen   Negative 


 


Ur Barbiturates Screen   Negative 


 


Ur Phencyclidine Scrn   Negative 


 


Ur Amphetamines Screen   Negative 


 


U Benzodiazepines Scrn   Negative 


 


U Oth Cocaine Metabols   Negative 


 


U Cannabinoids Screen   Positive H 


 


Alcohol, Quantitative    < 10














  12/27/17 12/27/17 12/27/17





  06:30 06:30 07:18


 


WBC  10.5  


 


RBC  4.37  


 


Hgb  11.7 L  


 


Hct  34.7 L  


 


MCV  79.4 L  


 


MCH  26.8  


 


MCHC  33.7  


 


RDW  13.6  


 


Plt Count  264  


 


MPV  9.8  


 


Gran %  80.9 H  


 


Lymph % (Auto)  14.5 L  


 


Mono % (Auto)  4.3  


 


Eos % (Auto)  0.1 L  


 


Baso % (Auto)  0.2  


 


Gran #  8.52 H  


 


Lymph #  1.5  


 


Mono #  0.5  


 


Eos #  0.0  


 


Baso #  0.02  


 


Sodium   141 


 


Potassium   4.1 


 


Chloride   105 


 


Carbon Dioxide   25 


 


Anion Gap   16 


 


BUN   11 


 


Creatinine   0.8 


 


Est GFR ( Amer)   > 60 


 


Est GFR (Non-Af Amer)   > 60 


 


POC Glucose (mg/dL)    105


 


Random Glucose   121 H 


 


Hemoglobin A1c   


 


Calcium   9.3 


 


Phosphorus   3.2 


 


Magnesium   1.8 


 


Total Bilirubin   0.5 


 


AST   31 


 


ALT   46 


 


Alkaline Phosphatase   47 


 


Troponin I   


 


Total Protein   7.7 


 


Albumin   4.5 


 


Globulin   3.1 


 


Albumin/Globulin Ratio   1.5 


 


Urine HCG, Qual   


 


Urine Opiates Screen   


 


Urine Methadone Screen   


 


Ur Barbiturates Screen   


 


Ur Phencyclidine Scrn   


 


Ur Amphetamines Screen   


 


U Benzodiazepines Scrn   


 


U Oth Cocaine Metabols   


 


U Cannabinoids Screen   


 


Alcohol, Quantitative   














  12/27/17





  11:13


 


WBC 


 


RBC 


 


Hgb 


 


Hct 


 


MCV 


 


MCH 


 


MCHC 


 


RDW 


 


Plt Count 


 


MPV 


 


Gran % 


 


Lymph % (Auto) 


 


Mono % (Auto) 


 


Eos % (Auto) 


 


Baso % (Auto) 


 


Gran # 


 


Lymph # 


 


Mono # 


 


Eos # 


 


Baso # 


 


Sodium 


 


Potassium 


 


Chloride 


 


Carbon Dioxide 


 


Anion Gap 


 


BUN 


 


Creatinine 


 


Est GFR ( Amer) 


 


Est GFR (Non-Af Amer) 


 


POC Glucose (mg/dL)  181 H


 


Random Glucose 


 


Hemoglobin A1c 


 


Calcium 


 


Phosphorus 


 


Magnesium 


 


Total Bilirubin 


 


AST 


 


ALT 


 


Alkaline Phosphatase 


 


Troponin I 


 


Total Protein 


 


Albumin 


 


Globulin 


 


Albumin/Globulin Ratio 


 


Urine HCG, Qual 


 


Urine Opiates Screen 


 


Urine Methadone Screen 


 


Ur Barbiturates Screen 


 


Ur Phencyclidine Scrn 


 


Ur Amphetamines Screen 


 


U Benzodiazepines Scrn 


 


U Oth Cocaine Metabols 


 


U Cannabinoids Screen 


 


Alcohol, Quantitative 














Assessment & Plan





- Assessment and Plan (Free Text)


Assessment: 


Arnoldo Arthur is a 27F w/ hx of DM, sickle cell trait, gastroparesis who 

presented to the ED with complaints of nausea, vomiting, abd pain, and 

generalized bone pain





DM with known gastroparesis


sickle cell disease/trait?


Abdominal pain, vomiting in setting of narcotic pain medication





Plan: 


- full liquid diet, advance slowly as tolerated


- Anti-emetic therapy PRN


- Would limit the use of narcotic pain medication as this may contribute to 

ongoing symptoms


- Continue with supportive care, IVF hydration


- Tight glycemic control, important in treatment of suspected gastroparesis


- No planned GI intervention, further plan as per medical team





D/W Dr. Wilson








<Morales Wilson - Last Filed: 12/27/17 14:57>





Meds





- Medications


Medications: 


 Current Medications





Acetaminophen (Tylenol 325mg Tab)  650 mg PO Q6H PRN


   PRN Reason: Pain, Mild (1-3)


   Last Admin: 12/27/17 04:14 Dose:  650 mg


Bisacodyl (Dulcolax)  10 mg RC Q24H PRN


   PRN Reason: constipation


Heparin Sodium (Porcine) (Heparin)  5,000 units SC Q8 [facility]


   PRN Reason: Protocol


   Last Admin: 12/27/17 14:16 Dose:  5,000 units


Sodium Chloride (Sodium Chloride 0.9%)  1,000 mls @ 100 mls/hr IV .Q10H [facility]


   Last Admin: 12/27/17 08:27 Dose:  100 mls/hr


Ceftriaxone Sodium 1,000 mg/ (Sodium Chloride)  100 mls @ 100 mls/hr IVPB Q24H 

[facility]


   PRN Reason: Protocol


   Stop: 01/01/18 10:01


   Last Admin: 12/27/17 10:24 Dose:  100 mls/hr


Azithromycin (Zithromax 500mg In Ns)  500 mg in 250 mls @ 167 mls/hr IVPB DAILY 

[facility]


   PRN Reason: Protocol


   Stop: 01/01/18 10:01


   Last Admin: 12/27/17 11:16 Dose:  167 mls/hr


Insulin Human Regular (Humulin R Low)  0 units SC ACHS [facility]


   PRN Reason: Protocol


   Last Admin: 12/27/17 12:28 Dose:  1 units


Lisinopril (Zestril)  10 mg PO DAILY [facility]


   Last Admin: 12/27/17 10:25 Dose:  10 mg


Ondansetron HCl (Zofran Inj)  4 mg IVP Q4H PRN


   PRN Reason: Nausea/Vomiting


   Last Admin: 12/27/17 01:46 Dose:  4 mg


Pantoprazole Sodium (Protonix Inj)  40 mg IVP DAILY [facility]


   Last Admin: 12/27/17 10:26 Dose:  40 mg


Promethazine HCl (Phenergan Inj)  25 mg IM Q6 [facility]











Results





- Vital Signs


Recent Vital Signs: 


 Last Vital Signs











Temp  98.6 F   12/27/17 08:32


 


Pulse  61   12/27/17 10:25


 


Resp  20   12/27/17 08:32


 


BP  128/82   12/27/17 10:25


 


Pulse Ox  98   12/27/17 08:32














- Labs


Result Diagrams: 


 12/27/17 06:30





 12/27/17 06:30


Labs: 


 Laboratory Results - last 24 hr











  12/26/17 12/26/17 12/26/17





  21:20 21:35 22:00


 


WBC   


 


RBC   


 


Hgb   


 


Hct   


 


MCV   


 


MCH   


 


MCHC   


 


RDW   


 


Plt Count   


 


MPV   


 


Gran %   


 


Lymph % (Auto)   


 


Mono % (Auto)   


 


Eos % (Auto)   


 


Baso % (Auto)   


 


Gran #   


 


Lymph #   


 


Mono #   


 


Eos #   


 


Baso #   


 


Sodium   


 


Potassium   


 


Chloride   


 


Carbon Dioxide   


 


Anion Gap   


 


BUN   


 


Creatinine   


 


Est GFR ( Amer)   


 


Est GFR (Non-Af Amer)   


 


POC Glucose (mg/dL)  131 H  


 


Random Glucose   


 


Hemoglobin A1c   


 


Calcium   


 


Phosphorus   


 


Magnesium   


 


Total Bilirubin   


 


AST   


 


ALT   


 


Alkaline Phosphatase   


 


Troponin I   < 0.01 


 


Total Protein   


 


Albumin   


 


Globulin   


 


Albumin/Globulin Ratio   


 


Urine HCG, Qual    Negative


 


Urine Opiates Screen   


 


Urine Methadone Screen   


 


Ur Barbiturates Screen   


 


Ur Phencyclidine Scrn   


 


Ur Amphetamines Screen   


 


U Benzodiazepines Scrn   


 


U Oth Cocaine Metabols   


 


U Cannabinoids Screen   


 


Alcohol, Quantitative   














  12/26/17 12/27/17 12/27/17





  22:10 03:15 06:30


 


WBC   


 


RBC   


 


Hgb   


 


Hct   


 


MCV   


 


MCH   


 


MCHC   


 


RDW   


 


Plt Count   


 


MPV   


 


Gran %   


 


Lymph % (Auto)   


 


Mono % (Auto)   


 


Eos % (Auto)   


 


Baso % (Auto)   


 


Gran #   


 


Lymph #   


 


Mono #   


 


Eos #   


 


Baso #   


 


Sodium   


 


Potassium   


 


Chloride   


 


Carbon Dioxide   


 


Anion Gap   


 


BUN   


 


Creatinine   


 


Est GFR ( Amer)   


 


Est GFR (Non-Af Amer)   


 


POC Glucose (mg/dL)   


 


Random Glucose   


 


Hemoglobin A1c  5.6  


 


Calcium   


 


Phosphorus   


 


Magnesium   


 


Total Bilirubin   


 


AST   


 


ALT   


 


Alkaline Phosphatase   


 


Troponin I   


 


Total Protein   


 


Albumin   


 


Globulin   


 


Albumin/Globulin Ratio   


 


Urine HCG, Qual   


 


Urine Opiates Screen   Positive H 


 


Urine Methadone Screen   Negative 


 


Ur Barbiturates Screen   Negative 


 


Ur Phencyclidine Scrn   Negative 


 


Ur Amphetamines Screen   Negative 


 


U Benzodiazepines Scrn   Negative 


 


U Oth Cocaine Metabols   Negative 


 


U Cannabinoids Screen   Positive H 


 


Alcohol, Quantitative    < 10














  12/27/17 12/27/17 12/27/17





  06:30 06:30 07:18


 


WBC  10.5  


 


RBC  4.37  


 


Hgb  11.7 L  


 


Hct  34.7 L  


 


MCV  79.4 L  


 


MCH  26.8  


 


MCHC  33.7  


 


RDW  13.6  


 


Plt Count  264  


 


MPV  9.8  


 


Gran %  80.9 H  


 


Lymph % (Auto)  14.5 L  


 


Mono % (Auto)  4.3  


 


Eos % (Auto)  0.1 L  


 


Baso % (Auto)  0.2  


 


Gran #  8.52 H  


 


Lymph #  1.5  


 


Mono #  0.5  


 


Eos #  0.0  


 


Baso #  0.02  


 


Sodium   141 


 


Potassium   4.1 


 


Chloride   105 


 


Carbon Dioxide   25 


 


Anion Gap   16 


 


BUN   11 


 


Creatinine   0.8 


 


Est GFR ( Amer)   > 60 


 


Est GFR (Non-Af Amer)   > 60 


 


POC Glucose (mg/dL)    105


 


Random Glucose   121 H 


 


Hemoglobin A1c   


 


Calcium   9.3 


 


Phosphorus   3.2 


 


Magnesium   1.8 


 


Total Bilirubin   0.5 


 


AST   31 


 


ALT   46 


 


Alkaline Phosphatase   47 


 


Troponin I   


 


Total Protein   7.7 


 


Albumin   4.5 


 


Globulin   3.1 


 


Albumin/Globulin Ratio   1.5 


 


Urine HCG, Qual   


 


Urine Opiates Screen   


 


Urine Methadone Screen   


 


Ur Barbiturates Screen   


 


Ur Phencyclidine Scrn   


 


Ur Amphetamines Screen   


 


U Benzodiazepines Scrn   


 


U Oth Cocaine Metabols   


 


U Cannabinoids Screen   


 


Alcohol, Quantitative   














  12/27/17





  11:13


 


WBC 


 


RBC 


 


Hgb 


 


Hct 


 


MCV 


 


MCH 


 


MCHC 


 


RDW 


 


Plt Count 


 


MPV 


 


Gran % 


 


Lymph % (Auto) 


 


Mono % (Auto) 


 


Eos % (Auto) 


 


Baso % (Auto) 


 


Gran # 


 


Lymph # 


 


Mono # 


 


Eos # 


 


Baso # 


 


Sodium 


 


Potassium 


 


Chloride 


 


Carbon Dioxide 


 


Anion Gap 


 


BUN 


 


Creatinine 


 


Est GFR ( Amer) 


 


Est GFR (Non-Af Amer) 


 


POC Glucose (mg/dL)  181 H


 


Random Glucose 


 


Hemoglobin A1c 


 


Calcium 


 


Phosphorus 


 


Magnesium 


 


Total Bilirubin 


 


AST 


 


ALT 


 


Alkaline Phosphatase 


 


Troponin I 


 


Total Protein 


 


Albumin 


 


Globulin 


 


Albumin/Globulin Ratio 


 


Urine HCG, Qual 


 


Urine Opiates Screen 


 


Urine Methadone Screen 


 


Ur Barbiturates Screen 


 


Ur Phencyclidine Scrn 


 


Ur Amphetamines Screen 


 


U Benzodiazepines Scrn 


 


U Oth Cocaine Metabols 


 


U Cannabinoids Screen 


 


Alcohol, Quantitative 














Attending/Attestation





- Attestation


I have personally seen and examined this patient.: Yes


I have fully participated in the care of the patient.: Yes


I have reviewed all pertinent clinical information: Yes


Notes (Text): 





12/27/17 14:56


27 year old female with h/o diabetes, gastroparesis admitted with exacerbation.


1. Gastroparesis


2. Constipation


Plan:


-recommend liquid diet and advance as tolerate to low fat / small freq meals


-start reglan 10 mg QID with meals and nighttime


-recommend protonix 40 mg daily


-start miralax bid


-optimize glycemic control


-minimize narcotics


-supportive care with hydration

## 2017-12-28 VITALS
OXYGEN SATURATION: 97 % | HEART RATE: 75 BPM | SYSTOLIC BLOOD PRESSURE: 132 MMHG | TEMPERATURE: 98.5 F | DIASTOLIC BLOOD PRESSURE: 86 MMHG

## 2017-12-28 LAB
ALBUMIN SERPL-MCNC: 3.9 G/DL (ref 3–4.8)
ALBUMIN/GLOB SERPL: 1.3 {RATIO} (ref 1.1–1.8)
ALT SERPL-CCNC: 44 U/L (ref 7–56)
AST SERPL-CCNC: 38 U/L (ref 14–36)
BASOPHILS # BLD AUTO: 0.03 K/MM3 (ref 0–2)
BASOPHILS NFR BLD: 0.4 % (ref 0–3)
BUN SERPL-MCNC: 5 MG/DL (ref 7–21)
CALCIUM SERPL-MCNC: 8.8 MG/DL (ref 8.4–10.5)
EOSINOPHIL # BLD: 0 10*3/UL (ref 0–0.7)
EOSINOPHIL NFR BLD: 0.1 % (ref 1.5–5)
ERYTHROCYTE [DISTWIDTH] IN BLOOD BY AUTOMATED COUNT: 13.3 % (ref 11.5–14.5)
GFR NON-AFRICAN AMERICAN: > 60
GRANULOCYTES # BLD: 5.49 10*3/UL (ref 1.4–6.5)
GRANULOCYTES NFR BLD: 77.2 % (ref 50–68)
HGB BLD-MCNC: 10.9 G/DL (ref 12–16)
LYMPHOCYTES # BLD: 1.2 10*3/UL (ref 1.2–3.4)
LYMPHOCYTES NFR BLD AUTO: 17.4 % (ref 22–35)
MCH RBC QN AUTO: 26.7 PG (ref 25–35)
MCHC RBC AUTO-ENTMCNC: 33.9 G/DL (ref 31–37)
MCV RBC AUTO: 78.7 FL (ref 80–105)
MONOCYTES # BLD AUTO: 0.4 10*3/UL (ref 0.1–0.6)
MONOCYTES NFR BLD: 4.9 % (ref 1–6)
PLATELET # BLD: 248 10^3/UL (ref 120–450)
PMV BLD AUTO: 9.5 FL (ref 7–11)
RBC # BLD AUTO: 4.09 10^6/UL (ref 3.5–6.1)
WBC # BLD AUTO: 7.1 10^3/UL (ref 4.5–11)

## 2017-12-28 RX ADMIN — INSULIN HUMAN SCH: 100 INJECTION, SOLUTION PARENTERAL at 07:51

## 2017-12-28 RX ADMIN — AZITHROMYCIN DIHYDRATE SCH MLS/HR: 500 INJECTION, POWDER, LYOPHILIZED, FOR SOLUTION INTRAVENOUS at 09:32

## 2017-12-28 RX ADMIN — INSULIN HUMAN SCH: 100 INJECTION, SOLUTION PARENTERAL at 00:20

## 2017-12-28 RX ADMIN — INSULIN HUMAN SCH: 100 INJECTION, SOLUTION PARENTERAL at 12:03

## 2017-12-28 NOTE — CP.PCM.PN
<Leydi Gonzalez - Last Filed: 12/28/17 10:31>





Subjective





- Date & Time of Evaluation


Date of Evaluation: 12/28/17


Time of Evaluation: 08:00





- Subjective


Subjective: 


PGY 4 Initial GI COnsult note





Pt seen and examined bedside 


States that she still has abd pain, but improved


reports x2 episodes of emesis since yesterday, but denies any hematemsis or 

coffee-ground emesis 


Denies melena 


Tolerated liquids 


Last BM 3 days ago





ROS: 10 point ROS conducted neg other than above








Objective





- Vital Signs/Intake and Output


Vital Signs (last 24 hours): 


 











Temp Pulse Resp BP Pulse Ox


 


 98.5 F   75   20   132/86   97 


 


 12/28/17 07:30  12/28/17 09:32  12/28/17 07:30  12/28/17 09:32  12/28/17 07:30








Intake and Output: 


 











 12/28/17 12/28/17





 06:59 18:59


 


Intake Total 480 


 


Balance 480 














- Medications


Medications: 


 Current Medications





Acetaminophen (Tylenol 325mg Tab)  650 mg PO Q6H PRN


   PRN Reason: Pain, Mild (1-3)


   Last Admin: 12/27/17 18:26 Dose:  650 mg


Bisacodyl (Dulcolax)  10 mg RC Q24H PRN


   PRN Reason: constipation


Heparin Sodium (Porcine) (Heparin)  5,000 units SC Q8 [facility]


   PRN Reason: Protocol


   Last Admin: 12/28/17 06:41 Dose:  5,000 units


Sodium Chloride (Sodium Chloride 0.9%)  1,000 mls @ 100 mls/hr IV .Q10H [facility]


   Last Admin: 12/28/17 06:40 Dose:  100 mls/hr


Ceftriaxone Sodium 1,000 mg/ (Sodium Chloride)  100 mls @ 100 mls/hr IVPB Q24H 

[facility]


   PRN Reason: Protocol


   Stop: 01/01/18 10:01


   Last Admin: 12/28/17 09:32 Dose:  100 mls/hr


Azithromycin (Zithromax 500mg In Ns)  500 mg in 250 mls @ 167 mls/hr IVPB DAILY 

[facility]


   PRN Reason: Protocol


   Stop: 01/01/18 10:01


   Last Admin: 12/28/17 09:32 Dose:  167 mls/hr


Insulin Human Regular (Humulin R Low)  0 units SC ACHS [facility]


   PRN Reason: Protocol


   Last Admin: 12/28/17 07:51 Dose:  Not Given


Lisinopril (Zestril)  10 mg PO DAILY [facility]


   Last Admin: 12/28/17 09:32 Dose:  10 mg


Ondansetron HCl (Zofran Inj)  4 mg IVP Q4H PRN


   PRN Reason: Nausea/Vomiting


   Last Admin: 12/28/17 04:29 Dose:  4 mg


Pantoprazole Sodium (Protonix Inj)  40 mg IVP DAILY [facility]


   Last Admin: 12/28/17 09:32 Dose:  40 mg


Promethazine HCl (Phenergan Inj)  25 mg IM Q6 [facility]


   Last Admin: 12/28/17 06:40 Dose:  25 mg











- Labs


Labs: 


 





 12/28/17 07:00 





 12/28/17 07:00 











- Constitutional


Appears: Well, No Acute Distress





- Head Exam


Head Exam: ATRAUMATIC, NORMOCEPHALIC





- Eye Exam


Eye Exam: Normal appearance





- ENT Exam


ENT Exam: Mucous Membranes Moist





- Respiratory Exam


Respiratory Exam: Clear to Ausculation Bilateral, NORMAL BREATHING PATTERN.  

absent: Rales, Rhonchi, Wheezes





- Cardiovascular Exam


Cardiovascular Exam: REGULAR RHYTHM, +S1, +S2





- GI/Abdominal Exam


GI & Abdominal Exam: Soft, Normal Bowel Sounds.  absent: Guarding, Rigid, 

Tenderness, Organomegaly





- Extremities Exam


Extremities Exam: absent: Joint Swelling, Pedal Edema





- Neurological Exam


Neurological Exam: Alert, Awake, Oriented x3





- Psychiatric Exam


Psychiatric exam: Normal Affect, Normal Mood





- Skin


Skin Exam: Dry, Intact, Normal Color, Warm





Assessment and Plan





- Assessment and Plan (Free Text)


Assessment: 


Arnoldo Arthur is a 27F w/ hx of DM, sickle cell trait, gastroparesis who 

presented to the ED with complaints of nausea, vomiting, abd pain, and 

generalized bone pain





DM with known gastroparesis


sickle cell disease/trait?


Abdominal pain, vomiting in setting of narcotic pain medication


Constipation





Plan: 


- advance to low residue


- Anti-emetic therapy PRN


- Would limit the use of narcotic pain medication as this may contribute to 

ongoing symptoms


- Continue with supportive care, IVF hydration


- Tight glycemic control, important in treatment of suspected gastroparesis


- No planned GI intervention, further plan as per medical team


- miralax TID, senna; can escalate to mag citrate if still unable to have BM


- recommend enema





D/W Dr. Wilson








<Morales Wilson - Last Filed: 12/28/17 15:20>





Objective





- Vital Signs/Intake and Output


Vital Signs (last 24 hours): 


 











Temp Pulse Resp BP Pulse Ox


 


 98.5 F   75   20   132/86   97 


 


 12/28/17 07:30  12/28/17 09:32  12/28/17 07:30  12/28/17 09:32  12/28/17 07:30








Intake and Output: 


 











 12/28/17 12/28/17





 06:59 18:59


 


Intake Total 480 


 


Balance 480 














- Labs


Labs: 


 





 12/28/17 07:00 





 12/28/17 07:00 











Attending/Attestation





- Attestation


I have personally seen and examined this patient.: Yes


I have fully participated in the care of the patient.: Yes


I have reviewed all pertinent clinical information, including history, physical 

exam and plan: Yes


Notes (Text): 





12/28/17 15:19


27 year old female with h/o dm, gastroparesis admitted with exacerbation.


1. Gastroparesis


2. Constipation


Plan:


-improving


-advance diet as tolerated


-bowel regimen


-ok for discharge


-rest as above

## 2017-12-28 NOTE — CP.PCM.PN
<Candelario Martinez - Last Filed: 12/29/17 13:39>





Subjective





- Date & Time of Evaluation


Date of Evaluation: 12/28/17


Time of Evaluation: 06:20





- Subjective


Subjective: 





Patient seen and examined this morning. As per overnight nurse, patient did not 

complain of pain overnight however threw up after consuming water over night. 

Patient states that she tried really hard to not bother nurse regarding her 

pain but was not able to sleep through the night. She states this happens every 

month at this exact date and doesn't understand why. States whether or not she 

has her period it still occurs. Occurred throughout her pregnancy and is still 

occurring on depo shot. 





Objective





- Vital Signs/Intake and Output


Vital Signs (last 24 hours): 


 











Temp Pulse Resp BP Pulse Ox


 


 98.6 F   64   20   124/81   100 


 


 12/27/17 16:00  12/27/17 16:00  12/27/17 16:00  12/27/17 16:00  12/27/17 16:00








Intake and Output: 


 











 12/27/17 12/28/17





 18:59 06:59


 


Intake Total  480


 


Balance  480














- Medications


Medications: 


 Current Medications





Acetaminophen (Tylenol 325mg Tab)  650 mg PO Q6H PRN


   PRN Reason: Pain, Mild (1-3)


   Last Admin: 12/27/17 18:26 Dose:  650 mg


Bisacodyl (Dulcolax)  10 mg RC Q24H PRN


   PRN Reason: constipation


Heparin Sodium (Porcine) (Heparin)  5,000 units SC Q8 [facility]


   PRN Reason: Protocol


   Last Admin: 12/27/17 21:05 Dose:  5,000 units


Sodium Chloride (Sodium Chloride 0.9%)  1,000 mls @ 100 mls/hr IV .Q10H [facility]


   Last Admin: 12/27/17 21:06 Dose:  100 mls/hr


Ceftriaxone Sodium 1,000 mg/ (Sodium Chloride)  100 mls @ 100 mls/hr IVPB Q24H 

[facility]


   PRN Reason: Protocol


   Stop: 01/01/18 10:01


   Last Admin: 12/27/17 10:24 Dose:  100 mls/hr


Azithromycin (Zithromax 500mg In Ns)  500 mg in 250 mls @ 167 mls/hr IVPB DAILY 

[facility]


   PRN Reason: Protocol


   Stop: 01/01/18 10:01


   Last Admin: 12/27/17 11:16 Dose:  167 mls/hr


Insulin Human Regular (Humulin R Low)  0 units SC ACHS [facility]


   PRN Reason: Protocol


   Last Admin: 12/28/17 00:20 Dose:  Not Given


Lisinopril (Zestril)  10 mg PO DAILY [facility]


   Last Admin: 12/27/17 10:25 Dose:  10 mg


Ondansetron HCl (Zofran Inj)  4 mg IVP Q4H PRN


   PRN Reason: Nausea/Vomiting


   Last Admin: 12/28/17 04:29 Dose:  4 mg


Pantoprazole Sodium (Protonix Inj)  40 mg IVP DAILY [facility]


   Last Admin: 12/27/17 10:26 Dose:  40 mg


Promethazine HCl (Phenergan Inj)  25 mg IM Q6 [facility]


   Last Admin: 12/28/17 00:59 Dose:  25 mg











- Labs


Labs: 


 





 12/27/17 06:30 





 12/27/17 06:30 











- Constitutional


Appears: Non-toxic, No Acute Distress





- Head Exam


Head Exam: NORMAL INSPECTION, NORMOCEPHALIC





- Eye Exam


Eye Exam: EOMI, Normal appearance





- ENT Exam


ENT Exam: Mucous Membranes Moist





- Neck Exam


Neck Exam: Normal Inspection





- Respiratory Exam


Respiratory Exam: Clear to Ausculation Bilateral, NORMAL BREATHING PATTERN





- Cardiovascular Exam


Cardiovascular Exam: REGULAR RHYTHM, +S1, +S2





- GI/Abdominal Exam


GI & Abdominal Exam: Soft, Normal Bowel Sounds





- Back Exam


Back Exam: NORMAL INSPECTION





- Neurological Exam


Neurological Exam: Alert, Awake, Oriented x3





- Psychiatric Exam


Psychiatric exam: Normal Affect, Normal Mood





- Skin


Skin Exam: Intact, Normal Color, Warm





Assessment and Plan





- Assessment and Plan (Free Text)


Assessment: 





26 yo F with PMH of DM type I, GERD, gastroparesis, HTN, and sickle cell trait 

presents to ER complaining of unrelenting abdominal pain, nausea, and vomiting 

with inability to tolerate PO for the past three days, though she did eat soup 

today. Has presented to the ER multiple times in the past for the same 

complaints. Also noted to be complaining of cough, and constipation. 











Plan: 





Intractable nausea, vomiting, and abdominal pain


- Likely due to cyclical vomiting syndrome vs gastroparesis due to narcotic use


- CT abdomen/pelvis shows no evidence of acute intraabdominal findings; Abd US 

unremarkable


- Moderately hyperglycemic on admission; pt reports compliance and good 

glycemic control at home


- HgbA1C 5.6


- Phenergan for nausea PRN


- GI consulted; recommends bowel regimen for home





Possible Pneumonia


- Patient complaining of productive cough, with leukocytosis


- CT abdomen/pelvis shows evidence of possible atelectasis vs. small infiltrate 

in right lung base


- Continue rocephin and zithromax, if patient is discharged will send home on 

PO doxycyline 





Constipation


- Patient reports chronic constipation, requiring manual disimpaction twice 

daily


- Dulcolax suppository PRN for constipation 


- Miralax added to regimen, if patient is still constipated GI recommends 

adding mag citrate





HTN


- BP well controlled, continue to monitor


- Lsinopril; hold for SBP <120, DBP <80





GERD


-EGD in 2/2016 with Dr. Luu revealed LA grade A reflux esophagitis, 

gastroparesis, and gastritis.


- Protonix IV 








GI PPx: Protonix


DVT PPx: Heparin





Patient seen, discussed, and reviewed with attending, Dr. Urbina








<Zakiya Urbina - Last Filed: 12/29/17 18:42>





Objective





- Vital Signs/Intake and Output


Vital Signs (last 24 hours): 


 











Temp Pulse Resp BP Pulse Ox


 


 98.5 F   75   20   132/86   97 


 


 12/28/17 07:30  12/28/17 09:32  12/28/17 07:30  12/28/17 09:32  12/28/17 07:30











- Labs


Labs: 


 





 12/28/17 07:00 





 12/28/17 07:00 











Attending/Attestation





- Attestation


I have personally seen and examined this patient.: Yes


I have fully participated in the care of the patient.: Yes


I have reviewed all pertinent clinical information, including history, physical 

exam and plan: Yes


Notes (Text): 





I have seen and examined the patient at bedside. Agree with the above note with 

the following additions/ exceptions: Briefly this is 27 year old female with 

history of DM-2, GERD, gastoparesis, HTN , sickle cell trait, and history of 

recurrent bouts of vomiting with intervening periods of normal health who was 

admitted for evaluation of an episode of nausea, vomiting and inability to 

tolerate per oral. Patient reports that these symptoms occur almost every month 

around her menstrual cycle. Patient reports that she has dm however her hba1c 

even on prior visits have been in normal range. Patient reports taking 

metformin once a day most of the time. She had egd in the past suggestive of 

gastroparesis. I have advised patient to follow up in motility clinic in [facility]. 


These recurrent episodes of vomiting can be due to gastroparesis which can be 

narcotic induced vs hormonal vs cyclical vomiting syndrome. All imaging 

findings reviewed. Continue phenargan. Counselling provided regarding narcotics 

and marijuana use. Continue dulcolax. GI consult appreciated. Patiient feels 

better today. She is able to tolerate the diet and her vomiting has subsided. 

She also had CAP on CT scan and had mild cough. Advised her to complete 

doxycline course. Upon discharge patient will follow up with Dr Mane and Dr Doshi.


Dr Zakiya Urbina

## 2017-12-29 NOTE — CP.PCM.DIS
<Candelario Martinez - Last Filed: 12/29/17 11:12>





Provider





- Provider


Date of Admission: 


12/26/17 20:01





Attending physician: 


Zakiya Urbina MD





Primary care physician: 


Diana Monzon MD





Consults: 





Gastroenterology: Dr. Wilson


Time Spent in preparation of Discharge (in minutes): 45





Diagnosis





- Discharge Diagnosis


(1) Constipation


Status: Chronic   Priority: Medium   





(2) Chronic abdominal pain


Status: Chronic   Priority: Medium   





(3) Persistent vomiting


Status: Chronic   Priority: Medium   





Hospital Course





- Lab Results


Lab Results: 


 Most Recent Lab Values











WBC  7.1 10^3/ul (4.5-11.0)  D 12/28/17  07:00    


 


RBC  4.09 10^6/uL (3.5-6.1)   12/28/17  07:00    


 


Hgb  10.9 g/dL (12.0-16.0)  L  12/28/17  07:00    


 


Hct  32.2 % (36.0-48.0)  L  12/28/17  07:00    


 


MCV  78.7 fl (80.0-105.0)  L  12/28/17  07:00    


 


MCH  26.7 pg (25.0-35.0)   12/28/17  07:00    


 


MCHC  33.9 g/dl (31.0-37.0)   12/28/17  07:00    


 


RDW  13.3 % (11.5-14.5)   12/28/17  07:00    


 


Plt Count  248 10^3/uL (120.0-450.0)   12/28/17  07:00    


 


MPV  9.5 fl (7.0-11.0)   12/28/17  07:00    


 


Gran %  77.2 % (50.0-68.0)  H  12/28/17  07:00    


 


Lymph % (Auto)  17.4 % (22.0-35.0)  L  12/28/17  07:00    


 


Mono % (Auto)  4.9 % (1.0-6.0)   12/28/17  07:00    


 


Eos % (Auto)  0.1 % (1.5-5.0)  L  12/28/17  07:00    


 


Baso % (Auto)  0.4 % (0.0-3.0)   12/28/17  07:00    


 


Gran #  5.49  (1.4-6.5)   12/28/17  07:00    


 


Lymph #  1.2  (1.2-3.4)   12/28/17  07:00    


 


Mono #  0.4  (0.1-0.6)   12/28/17  07:00    


 


Eos #  0.0  (0.0-0.7)   12/28/17  07:00    


 


Baso #  0.03 K/mm3 (0.0-2.0)   12/28/17  07:00    


 


Sodium  140 mmol/L (132-148)   12/28/17  07:00    


 


Potassium  3.9 mmol/L (3.6-5.0)   12/28/17  07:00    


 


Chloride  105 mmol/L ()   12/28/17  07:00    


 


Carbon Dioxide  24 mmol/L (21-33)   12/28/17  07:00    


 


Anion Gap  15  (10-20)   12/28/17  07:00    


 


BUN  5 mg/dL (7-21)  L  12/28/17  07:00    


 


Creatinine  0.6 mg/dl (0.7-1.2)  L  12/28/17  07:00    


 


Est GFR ( Amer)  > 60   12/28/17  07:00    


 


Est GFR (Non-Af Amer)  > 60   12/28/17  07:00    


 


POC Glucose (mg/dL)  133 mg/dL ()  H  12/28/17  11:14    


 


Random Glucose  130 mg/dL ()  H  12/28/17  07:00    


 


Hemoglobin A1c  5.6 % (4.2-6.5)   12/26/17  22:10    


 


Calcium  8.8 mg/dL (8.4-10.5)   12/28/17  07:00    


 


Phosphorus  3.2 mg/dL (2.5-4.5)   12/27/17  06:30    


 


Magnesium  1.8 mg/dL (1.7-2.2)   12/27/17  06:30    


 


Total Bilirubin  0.5 mg/dL (0.2-1.3)   12/28/17  07:00    


 


AST  38 U/L (14-36)  H D 12/28/17  07:00    


 


ALT  44 U/L (7-56)   12/28/17  07:00    


 


Alkaline Phosphatase  44 U/L ()   12/28/17  07:00    


 


Troponin I  < 0.01 ng/mL  12/26/17  21:35    


 


Total Protein  6.8 g/dL (5.8-8.3)   12/28/17  07:00    


 


Albumin  3.9 g/dL (3.0-4.8)   12/28/17  07:00    


 


Globulin  2.9 gm/dL  12/28/17  07:00    


 


Albumin/Globulin Ratio  1.3  (1.1-1.8)   12/28/17  07:00    


 


Lipase  123 U/L ()   12/26/17  16:35    


 


Urine Color  Light yellow  (YELLOW)   12/26/17  16:11    


 


Urine Appearance  Clear  (CLEAR)   12/26/17  16:11    


 


Urine pH  6.0  (4.7-8.0)   12/26/17  16:11    


 


Ur Specific Gravity  1.020  (1.005-1.035)   12/26/17  16:11    


 


Urine Protein  30 mg/dL (<30 mg/dL)  H  12/26/17  16:11    


 


Urine Glucose (UA)  Negative mg/dL (NEGATIVE)   12/26/17  16:11    


 


Urine Ketones  Negative mg/dL (NEGATIVE)   12/26/17  16:11    


 


Urine Blood  Small  (NEGATIVE)  H  12/26/17  16:11    


 


Urine Nitrate  Negative  (NEGATIVE)   12/26/17  16:11    


 


Urine Bilirubin  Negative  (NEGATIVE)   12/26/17  16:11    


 


Urine Urobilinogen  0.2 E.U./dL (<1 E.U./dL)   12/26/17  16:11    


 


Ur Leukocyte Esterase  Negative Emma/uL (NEGATIVE)   12/26/17  16:11    


 


Urine RBC  1 - 3 /hpf (0-2)   12/26/17  16:11    


 


Urine WBC  0 - 2 /hpf (0-6)   12/26/17  16:11    


 


Ur Epithelial Cells  0 - 2 /hpf (0-5)   12/26/17  16:11    


 


Urine Bacteria  Small  (NEG)   12/26/17  16:11    


 


Urine HCG, Qual  Negative  (NEGATIVE)   12/26/17  22:00    


 


Urine Opiates Screen  Positive  (NEGATIVE)  H  12/27/17  03:15    


 


Urine Methadone Screen  Negative  (NEGATIVE)   12/27/17  03:15    


 


Ur Barbiturates Screen  Negative  (NEGATIVE)   12/27/17  03:15    


 


Ur Phencyclidine Scrn  Negative  (NEGATIVE)   12/27/17  03:15    


 


Ur Amphetamines Screen  Negative  (NEGATIVE)   12/27/17  03:15    


 


U Benzodiazepines Scrn  Negative  (NEGATIVE)   12/27/17  03:15    


 


U Oth Cocaine Metabols  Negative  (NEGATIVE)   12/27/17  03:15    


 


U Cannabinoids Screen  Positive  (NEGATIVE)  H  12/27/17  03:15    


 


Alcohol, Quantitative  < 10 mg/dL (0-10)   12/27/17  06:30    














- Hospital Course


Hospital Course: 





26 yo F with PMH of DM type I, GERD, gastroparesis, HTN, and sickle cell trait 

presents to ER complaining of unrelenting abdominal pain, nausea, and vomiting 

with inability to tolerate PO for the past three days. She has had similar 

symptoms, on and off, for about 4 years, which she associates with increased 

stress. She typically gets these symptoms around the time of her period. 

Patient seen and examined this morning.  During course of hospital stay images 

done included CT abdomen/pelvis which revealed probable atelectasis versus a 

small infiltrate in the right lung base with no evidence of acute 

intraabdominal process. Doxycylcine will be provided for patient due to small 

infiltrate in right lung base found on imaging. Gallbladder and common bile 

duct ultrasound revealed no significant or acute findings to account for, chest 

x-ray showed no active disease. Gastroenterology was consulted and recommended 

bowel regimen. As per overnight nurse, patient did not complain of pain 

overnight however threw up after consuming water over night. Patient states 

that she tried really hard to not bother nurse regarding her pain but was not 

able to sleep through the night. She states this happens every month at this 

exact date and doesn't understand why. States whether or not she has her period 

it still occurs. Occurred throughout her pregnancy and is still occurring on 

depo shot. Patient's presentation fits the characteristics of cyclical vomiting 

syndrome. Due to this diagnosis, it's recommended to give amitriptyline instead 

of narcotics which may only cause further cause gastroparesis which will lead 

to increased nausea and vomiting.  This was explained and discussed with 

patient. Patient was willing to give the new medication a try. Patient was then 

discharged with prescriptions to  bowel regimen medications, 

amitriptyline, and doxycycline at [place] [facility] [phone]. 





Discharge Exam





- Head Exam


Head Exam: ATRAUMATIC, NORMOCEPHALIC





- Eye Exam


Eye Exam: EOMI, Normal appearance





- Respiratory Exam


Respiratory Exam: NORMAL BREATHING PATTERN, UNREMARKABLE





- Cardiovascular Exam


Cardiovascular Exam: REGULAR RHYTHM, +S1, +S2





- GI/Abdominal Exam


GI & Abdominal Exam: Normal Bowel Sounds, Unremarkable





- Neurological Exam


Neurological exam: Alert, CN II-XII Intact, Oriented x3





- Psychiatric Exam


Psychiatric exam: Normal Affect, Normal Mood





- Skin


Skin Exam: Intact, Normal Color, Warm





Discharge Plan





- Discharge Medications


Prescriptions: 


Amitriptyline [Elavil] 10 mg PO HS 7 Days #7 tab


Doxycycline Hyclate 100 mg PO BID 7 Days #14 capsule


Magnesium Citrate [[facility] Magnesium Citrate] 1.75 gm PO ONCE 1 

Days #1 bottle


Polyethylene Glycol 3350 [Miralax] 17 gm PO TID 14 Days #42 packet


Sennosides [Senna] 8.6 mg PO DAILY 5 Days #5 tablet





- Follow Up Plan


Condition: STABLE


Disposition: HOME/ ROUTINE


Instructions:  Constipation (DC), Sickle Cell Anemia (DC), Acute Nausea and 

Vomiting (GEN), Abdominal Pain (ED)


Additional Instructions: 


1. Please follow up with your PMD within 3-5 days of discharge regarding this 

hospitalization.


2. Please go to your pharmacy and fill prescriptions and take as prescribed.


3. Please do not hesistate to return to ED if symptoms return or worsen. 


Referrals: 


Diana Monzon MD [Primary Care Provider] - 





<Zakiya Urbina - Last Filed: 12/29/17 18:42>





Provider





- Provider


Date of Admission: 


12/26/17 20:01





Attending physician: 


Zakiya Urbina MD





Primary care physician: 


Diana Monzon MD








Hospital Course





- Lab Results


Lab Results: 


 Most Recent Lab Values











WBC  7.1 10^3/ul (4.5-11.0)  D 12/28/17  07:00    


 


RBC  4.09 10^6/uL (3.5-6.1)   12/28/17  07:00    


 


Hgb  10.9 g/dL (12.0-16.0)  L  12/28/17  07:00    


 


Hct  32.2 % (36.0-48.0)  L  12/28/17  07:00    


 


MCV  78.7 fl (80.0-105.0)  L  12/28/17  07:00    


 


MCH  26.7 pg (25.0-35.0)   12/28/17  07:00    


 


MCHC  33.9 g/dl (31.0-37.0)   12/28/17  07:00    


 


RDW  13.3 % (11.5-14.5)   12/28/17  07:00    


 


Plt Count  248 10^3/uL (120.0-450.0)   12/28/17  07:00    


 


MPV  9.5 fl (7.0-11.0)   12/28/17  07:00    


 


Gran %  77.2 % (50.0-68.0)  H  12/28/17  07:00    


 


Lymph % (Auto)  17.4 % (22.0-35.0)  L  12/28/17  07:00    


 


Mono % (Auto)  4.9 % (1.0-6.0)   12/28/17  07:00    


 


Eos % (Auto)  0.1 % (1.5-5.0)  L  12/28/17  07:00    


 


Baso % (Auto)  0.4 % (0.0-3.0)   12/28/17  07:00    


 


Gran #  5.49  (1.4-6.5)   12/28/17  07:00    


 


Lymph #  1.2  (1.2-3.4)   12/28/17  07:00    


 


Mono #  0.4  (0.1-0.6)   12/28/17  07:00    


 


Eos #  0.0  (0.0-0.7)   12/28/17  07:00    


 


Baso #  0.03 K/mm3 (0.0-2.0)   12/28/17  07:00    


 


Sodium  140 mmol/L (132-148)   12/28/17  07:00    


 


Potassium  3.9 mmol/L (3.6-5.0)   12/28/17  07:00    


 


Chloride  105 mmol/L ()   12/28/17  07:00    


 


Carbon Dioxide  24 mmol/L (21-33)   12/28/17  07:00    


 


Anion Gap  15  (10-20)   12/28/17  07:00    


 


BUN  5 mg/dL (7-21)  L  12/28/17  07:00    


 


Creatinine  0.6 mg/dl (0.7-1.2)  L  12/28/17  07:00    


 


Est GFR ( Amer)  > 60   12/28/17  07:00    


 


Est GFR (Non-Af Amer)  > 60   12/28/17  07:00    


 


POC Glucose (mg/dL)  133 mg/dL ()  H  12/28/17  11:14    


 


Random Glucose  130 mg/dL ()  H  12/28/17  07:00    


 


Hemoglobin A1c  5.6 % (4.2-6.5)   12/26/17  22:10    


 


Calcium  8.8 mg/dL (8.4-10.5)   12/28/17  07:00    


 


Phosphorus  3.2 mg/dL (2.5-4.5)   12/27/17  06:30    


 


Magnesium  1.8 mg/dL (1.7-2.2)   12/27/17  06:30    


 


Total Bilirubin  0.5 mg/dL (0.2-1.3)   12/28/17  07:00    


 


AST  38 U/L (14-36)  H D 12/28/17  07:00    


 


ALT  44 U/L (7-56)   12/28/17  07:00    


 


Alkaline Phosphatase  44 U/L ()   12/28/17  07:00    


 


Troponin I  < 0.01 ng/mL  12/26/17  21:35    


 


Total Protein  6.8 g/dL (5.8-8.3)   12/28/17  07:00    


 


Albumin  3.9 g/dL (3.0-4.8)   12/28/17  07:00    


 


Globulin  2.9 gm/dL  12/28/17  07:00    


 


Albumin/Globulin Ratio  1.3  (1.1-1.8)   12/28/17  07:00    


 


Lipase  123 U/L ()   12/26/17  16:35    


 


Urine Color  Light yellow  (YELLOW)   12/26/17  16:11    


 


Urine Appearance  Clear  (CLEAR)   12/26/17  16:11    


 


Urine pH  6.0  (4.7-8.0)   12/26/17  16:11    


 


Ur Specific Gravity  1.020  (1.005-1.035)   12/26/17  16:11    


 


Urine Protein  30 mg/dL (<30 mg/dL)  H  12/26/17  16:11    


 


Urine Glucose (UA)  Negative mg/dL (NEGATIVE)   12/26/17  16:11    


 


Urine Ketones  Negative mg/dL (NEGATIVE)   12/26/17  16:11    


 


Urine Blood  Small  (NEGATIVE)  H  12/26/17  16:11    


 


Urine Nitrate  Negative  (NEGATIVE)   12/26/17  16:11    


 


Urine Bilirubin  Negative  (NEGATIVE)   12/26/17  16:11    


 


Urine Urobilinogen  0.2 E.U./dL (<1 E.U./dL)   12/26/17  16:11    


 


Ur Leukocyte Esterase  Negative Emma/uL (NEGATIVE)   12/26/17  16:11    


 


Urine RBC  1 - 3 /hpf (0-2)   12/26/17  16:11    


 


Urine WBC  0 - 2 /hpf (0-6)   12/26/17  16:11    


 


Ur Epithelial Cells  0 - 2 /hpf (0-5)   12/26/17  16:11    


 


Urine Bacteria  Small  (NEG)   12/26/17  16:11    


 


Urine HCG, Qual  Negative  (NEGATIVE)   12/26/17  22:00    


 


Urine Opiates Screen  Positive  (NEGATIVE)  H  12/27/17  03:15    


 


Urine Methadone Screen  Negative  (NEGATIVE)   12/27/17  03:15    


 


Ur Barbiturates Screen  Negative  (NEGATIVE)   12/27/17  03:15    


 


Ur Phencyclidine Scrn  Negative  (NEGATIVE)   12/27/17  03:15    


 


Ur Amphetamines Screen  Negative  (NEGATIVE)   12/27/17  03:15    


 


U Benzodiazepines Scrn  Negative  (NEGATIVE)   12/27/17  03:15    


 


U Oth Cocaine Metabols  Negative  (NEGATIVE)   12/27/17  03:15    


 


U Cannabinoids Screen  Positive  (NEGATIVE)  H  12/27/17  03:15    


 


Alcohol, Quantitative  < 10 mg/dL (0-10)   12/27/17  06:30    














Attending/Attestation





- Attestation


I have personally seen and examined this patient.: Yes


I have fully participated in the care of the patient.: Yes


I have reviewed all pertinent clinical information, including history, physical 

exam and plan: Yes


Notes (Text): 





I have seen and examined the patient at bedside. Agree with the above note with 

the following additions/ exceptions: Briefly this is 27 year old female with 

history of DM-2, GERD, gastoparesis, HTN , sickle cell trait, and history of 

recurrent bouts of vomiting with intervening periods of normal health who was 

admitted for evaluation of an episode of nausea, vomiting and inability to 

tolerate per oral. Patient reports that these symptoms occur almost every month 

around her menstrual cycle. Patient reports that she has dm however her hba1c 

even on prior visits have been in normal range. Patient reports taking 

metformin once a day most of the time. She had egd in the past suggestive of 

gastroparesis. I have advised patient to follow up in motility clinic in [facility]. 


These recurrent episodes of vomiting can be due to gastroparesis which can be 

narcotic induced vs hormonal vs cyclical vomiting syndrome. All imaging 

findings reviewed. Continue phenargan. Counselling provided regarding narcotics 

and marijuana use. Continue dulcolax. GI consult appreciated. Patient feels 

better today. She is able to tolerate the diet and her vomiting has subsided. 

She also had CAP on CT scan and had mild cough. Advised her to complete 

doxycycline course. Upon discharge patient will follow up with Dr Diana Monzon 

and Dr Doshi.


Dr Zakiya Urbina

## 2018-01-11 ENCOUNTER — HOSPITAL ENCOUNTER (OUTPATIENT)
Dept: HOSPITAL 42 - ED | Age: 28
Setting detail: OBSERVATION
LOS: 1 days | Discharge: HOME | End: 2018-01-12
Attending: INTERNAL MEDICINE | Admitting: INTERNAL MEDICINE
Payer: COMMERCIAL

## 2018-01-11 VITALS — RESPIRATION RATE: 20 BRPM

## 2018-01-11 VITALS — BODY MASS INDEX: 23.8 KG/M2

## 2018-01-11 DIAGNOSIS — G89.29: ICD-10-CM

## 2018-01-11 DIAGNOSIS — I10: ICD-10-CM

## 2018-01-11 DIAGNOSIS — Z83.3: ICD-10-CM

## 2018-01-11 DIAGNOSIS — Z80.3: ICD-10-CM

## 2018-01-11 DIAGNOSIS — K21.9: ICD-10-CM

## 2018-01-11 DIAGNOSIS — Z83.2: ICD-10-CM

## 2018-01-11 DIAGNOSIS — G43.A0: ICD-10-CM

## 2018-01-11 DIAGNOSIS — E10.43: Primary | ICD-10-CM

## 2018-01-11 DIAGNOSIS — K31.84: ICD-10-CM

## 2018-01-11 DIAGNOSIS — J18.9: ICD-10-CM

## 2018-01-11 DIAGNOSIS — F17.210: ICD-10-CM

## 2018-01-11 DIAGNOSIS — Z79.4: ICD-10-CM

## 2018-01-11 DIAGNOSIS — D57.3: ICD-10-CM

## 2018-01-11 LAB
ALBUMIN SERPL-MCNC: 5.2 G/DL (ref 3–4.8)
ALBUMIN/GLOB SERPL: 1.2 {RATIO} (ref 1.1–1.8)
ALT SERPL-CCNC: 60 U/L (ref 7–56)
APPEARANCE UR: CLEAR
AST SERPL-CCNC: 37 U/L (ref 14–36)
BACTERIA #/AREA URNS HPF: (no result) /[HPF]
BILIRUB UR-MCNC: NEGATIVE MG/DL
BUN SERPL-MCNC: 18 MG/DL (ref 7–21)
CALCIUM SERPL-MCNC: 10.4 MG/DL (ref 8.4–10.5)
COLOR UR: YELLOW
EPI CELLS #/AREA URNS HPF: (no result) /HPF (ref 0–5)
ERYTHROCYTE [DISTWIDTH] IN BLOOD BY AUTOMATED COUNT: 14.3 % (ref 11.5–14.5)
GFR NON-AFRICAN AMERICAN: > 60
GLUCOSE UR STRIP-MCNC: NEGATIVE MG/DL
HCG,QUALITATIVE URINE: NEGATIVE
HGB BLD-MCNC: 13.4 G/DL (ref 12–16)
LEUKOCYTE ESTERASE UR-ACNC: NEGATIVE LEU/UL
LIPASE SERPL-CCNC: 52 U/L (ref 23–300)
MCH RBC QN AUTO: 27.5 PG (ref 25–35)
MCHC RBC AUTO-ENTMCNC: 34.4 G/DL (ref 31–37)
MCV RBC AUTO: 79.9 FL (ref 80–105)
PH UR STRIP: 6 [PH] (ref 4.7–8)
PLATELET # BLD: 319 10^3/UL (ref 120–450)
PMV BLD AUTO: 9.6 FL (ref 7–11)
PROT UR STRIP-MCNC: 100 MG/DL
RBC # BLD AUTO: 4.87 10^6/UL (ref 3.5–6.1)
RBC # UR STRIP: (no result) /UL
SP GR UR STRIP: 1.02 (ref 1–1.03)
URINE NITRATE: NEGATIVE
UROBILINOGEN UR STRIP-ACNC: 0.2 E.U./DL
WBC # BLD AUTO: 13.9 10^3/UL (ref 4.5–11)
WBC #/AREA URNS HPF: (no result) /HPF (ref 0–6)

## 2018-01-11 PROCEDURE — 96361 HYDRATE IV INFUSION ADD-ON: CPT

## 2018-01-11 PROCEDURE — 84703 CHORIONIC GONADOTROPIN ASSAY: CPT

## 2018-01-11 PROCEDURE — 99285 EMERGENCY DEPT VISIT HI MDM: CPT

## 2018-01-11 PROCEDURE — 71250 CT THORAX DX C-: CPT

## 2018-01-11 PROCEDURE — 71046 X-RAY EXAM CHEST 2 VIEWS: CPT

## 2018-01-11 PROCEDURE — 36415 COLL VENOUS BLD VENIPUNCTURE: CPT

## 2018-01-11 PROCEDURE — 96376 TX/PRO/DX INJ SAME DRUG ADON: CPT

## 2018-01-11 PROCEDURE — 83690 ASSAY OF LIPASE: CPT

## 2018-01-11 PROCEDURE — 96375 TX/PRO/DX INJ NEW DRUG ADDON: CPT

## 2018-01-11 PROCEDURE — 81001 URINALYSIS AUTO W/SCOPE: CPT

## 2018-01-11 PROCEDURE — 82948 REAGENT STRIP/BLOOD GLUCOSE: CPT

## 2018-01-11 PROCEDURE — 80053 COMPREHEN METABOLIC PANEL: CPT

## 2018-01-11 PROCEDURE — 96372 THER/PROPH/DIAG INJ SC/IM: CPT

## 2018-01-11 PROCEDURE — 96374 THER/PROPH/DIAG INJ IV PUSH: CPT

## 2018-01-11 PROCEDURE — 85025 COMPLETE CBC W/AUTO DIFF WBC: CPT

## 2018-01-11 PROCEDURE — 85027 COMPLETE CBC AUTOMATED: CPT

## 2018-01-11 RX ADMIN — INSULIN HUMAN SCH: 100 INJECTION, SOLUTION PARENTERAL at 12:48

## 2018-01-11 RX ADMIN — INSULIN HUMAN SCH: 100 INJECTION, SOLUTION PARENTERAL at 18:06

## 2018-01-11 RX ADMIN — INSULIN HUMAN SCH: 100 INJECTION, SOLUTION PARENTERAL at 07:30

## 2018-01-11 RX ADMIN — INSULIN HUMAN SCH: 100 INJECTION, SOLUTION PARENTERAL at 22:38

## 2018-01-11 NOTE — ED PDOC
Arrival/HPI





- General


Chief Complaint: GI Problem


Time Seen by Provider: 01/11/18 02:17


Historian: Patient





- History of Present Illness


Narrative History of Present Illness (Text): 


01/11/18 02:28


Arnoldo Arthur is a 27 year old female, whose past medical history includes 

hypertension, GERD, diabetes, gastroparesis, and sickle cell trait, who 

presents to the Emergency department complaining of nausea and recurrent 

vomiting for the past 3-4 days. Patient also reports associated abdominal 

cramping. Patient denies any fever, chills, chest pain, shortness of breath, 

diarrhea, urinary symptoms, back pain, neck pain, headache, dizziness, or any 

other complaints.


Time/Duration: < week (4 days)


Symptom Onset: Gradual


Symptom Course: Unchanged


Activities at Onset: Light


Context: Home





Past Medical History





- Provider Review


Nursing Documentation Reviewed: Yes





- Infectious Disease


Hx of Infectious Diseases: None





- Tetanus Immunization


Tetanus Immunization: Unknown





- Cardiac


Hx Hypertension: Yes





- Pulmonary


Hx Respiratory Disorders: No





- Neurological


Hx Neurological Disorder: No





- HEENT


Hx HEENT Disorder: No





- Renal


Hx Renal Disorder: No





- Endocrine/Metabolic


Hx Diabetes Mellitus Type 1: Yes (dx 9 yrs old)





- Hematological/Oncological


Hx Sickle Cell Disease: Yes ("JUST FOUND OUT A MONTH AGO")





- Integumentary


Hx Dermatological Disorder: No





- Musculoskeletal/Rheumatological


Hx Osteoporosis: Yes (pt unsure of this hx)





- Gastrointestinal


Hx Gastritis: Yes





- Genitourinary/Gynecological


Hx Genitourinary Disorders: No





- Psychiatric


Hx Anxiety: Yes


Hx Depression: Yes


Hx Substance Use: Yes





- Past Surgical History


Past Surgical History: Non-Contributing





- Anesthesia


Hx Anesthesia: Yes


Hx Anesthesia Reactions: No


Hx Malignant Hyperthermia: No





- Suicidal Assessment


Feels Threatened In Home Enviroment: No





Family/Social History





- Physician Review


Nursing Documentation Reviewed: Yes


Family/Social History: Unknown Family HX


Smoking Status: Light Smoker < 10 Cigarettes Daily


Hx Alcohol Use: No


Hx Substance Use: Yes


Substance used: weed


Hx Substance Use Treatment: No





Allergies/Home Meds


Allergies/Adverse Reactions: 


Allergies





No Known Allergies Allergy (Verified 01/11/18 02:16)


 








Home Medications: 


 Home Meds











 Medication  Instructions  Recorded  Confirmed


 


metFORMIN [glucOPHAGE] 500 mg PO BID 10/19/17 12/26/17


 


Lisinopril 1 tab PO DAILY 12/02/17 12/26/17


 


Protonix 40 mg PO DAILY 12/02/17 12/26/17


 


Ondansetron ODT [Zofran ODT] 4 mg PO PRN PRN 12/26/17 12/26/17














Review of Systems





- Physician Review


All systems were reviewed & negative as marked: Yes





- Review of Systems


Constitutional: Normal.  absent: Fevers


Eyes: Normal


ENT: Normal


Respiratory: Normal.  absent: SOB, Cough


Cardiovascular: Normal.  absent: Chest Pain


Gastrointestinal: Abdominal Pain, Nausea, Vomiting.  absent: Diarrhea


Genitourinary Female: Normal.  absent: Dysuria, Frequency, Hematuria, Urine 

Output Changes


Musculoskeletal: Normal.  absent: Back Pain, Neck Pain


Skin: Normal.  absent: Rash


Neurological: Normal.  absent: Headache, Dizziness


Endocrine: Normal


Hemo/Lymphatic: Normal


Psychiatric: Normal





Physical Exam


Vital Signs Reviewed: Yes


Vital Signs











  Temp Pulse Resp BP Pulse Ox


 


 01/11/18 03:17  98.4 F  83  18  157/106 H  98


 


 01/11/18 02:12  98.4 F  83  18  157/106 H  97











Temperature: Afebrile


Blood Pressure: Normal


Pulse: Regular


Respiratory Rate: Normal


Appearance: Positive for: Well-Appearing, Non-Toxic, Comfortable


Pain Distress: None


Mental Status: Positive for: Alert and Oriented X 3





- Systems Exam


Head: Present: Atraumatic, Normocephalic


Pupils: Present: PERRL


Extroacular Muscles: Present: EOMI


Conjunctiva: Present: Normal


Mouth: Present: Moist Mucous Membranes


Neck: Present: Normal Range of Motion


Respiratory/Chest: Present: Clear to Auscultation, Good Air Exchange.  No: 

Respiratory Distress, Accessory Muscle Use


Cardiovascular: Present: Regular Rate and Rhythm, Normal S1, S2.  No: Murmurs


Abdomen: Present: Normal Bowel Sounds.  No: Tenderness, Distention, Peritoneal 

Signs


Back: Present: Normal Inspection


Upper Extremity: Present: Normal Inspection.  No: Cyanosis, Edema


Lower Extremity: Present: Normal Inspection.  No: Edema


Neurological: Present: GCS=15, CN II-XII Intact, Speech Normal


Skin: Present: Warm, Dry, Normal Color.  No: Rashes


Psychiatric: Present: Alert, Oriented x 3, Normal Insight, Normal Concentration





Medical Decision Making


ED Course and Treatment: 


01/11/18 02:28


Impression:


27 year old female complaining of nausea, recurrent vomiting, and abdominal 

cramping.





Plan:


-- Labs


-- Urinalysis


-- IV fluids


-- Zofran


-- Pepcid


-- Toradol


-- Reassess and disposition





Prior Visits:


Notes and results from previous visits were reviewed. 


On 12/26/2017, pt was seen in the Emergency department for abdominal pain, 

nausea, and vomiting. Pt was admitted to the hospital for further evaluation.





Progress Notes:


01/11/18 04:12


Case discussed with medical resident on call and Dr. Townsend, who are aware and 

agree with plan. Accepts pt in to hospitalist service. Pt will go to [facility] 

observation for intractable vomiting and gastroparesis.








- Lab Interpretations


Lab Results: 








 01/11/18 02:52 





 01/11/18 02:52 





 Lab Results





01/11/18 03:02: Urine Color Yellow, Urine Appearance Clear, Urine pH 6.0, Ur 

Specific Gravity 1.025, Urine Protein 100 H, Urine Glucose (UA) Negative, Urine 

Ketones Negative, Urine Blood Moderate H, Urine Nitrate Negative, Urine 

Bilirubin Negative, Urine Urobilinogen 0.2, Ur Leukocyte Esterase Negative, 

Urine RBC 2 - 5, Urine WBC 0 - 2, Ur Epithelial Cells 0 - 2, Urine Bacteria Few

, Urine HCG, Qual Negative


01/11/18 02:52: WBC 13.9 H D, RBC 4.87, Hgb 13.4  D, Hct 38.9, MCV 79.9 L, MCH 

27.5, MCHC 34.4, RDW 14.3, Plt Count 319, MPV 9.6


01/11/18 02:52: Sodium 142, Potassium 3.9, Chloride 96 L, Carbon Dioxide 29, 

Anion Gap 21 H, BUN 18, Creatinine 0.7, Est GFR (African Amer) > 60, Est GFR (

Non-Af Amer) > 60, Random Glucose 185 H, Calcium 10.4, Total Bilirubin 0.8, AST 

37 H, ALT 60 H, Alkaline Phosphatase 57, Total Protein 9.5 H, Albumin 5.2 H, 

Globulin 4.3, Albumin/Globulin Ratio 1.2, Lipase 52








I have reviewed the lab results: Yes





- Medication Orders


Current Medication Orders: 








Sodium Chloride (Sodium Chloride 0.9%)  1,000 mls @ 100 mls/hr IV .Q10H LUIS ANTONIO


   Last Admin: 01/11/18 04:52  Dose: 100 mls/hr





eMAR Start Stop


 Document     01/11/18 04:52  IT  (Rec: 01/11/18 04:52  IT  [id number]


     Intravenous Solution


      Start Date                                 01/11/18


      Start Time                                 04:52





Insulin Human Regular (Humulin R Low)  0 units SC Q6H LUIS ANTONIO


   PRN Reason: Protocol


   Last Admin: 01/11/18 04:53 Dose:  Not Given


   Non-Admin Reason: Blood Sugar Parameter





MAR Blood Glucose


 Document     01/11/18 04:53  IT  (Rec: 01/11/18 04:53  IT  [id number]


     Blood Glucose


      Finger Stick Blood Glucose ()        147





Ketorolac Tromethamine (Toradol)  60 mg IM Q12H PRN


   PRN Reason: Pain, severe (8-10)


   Stop: 01/16/18 04:16


Ondansetron HCl (Zofran Inj)  4 mg IVP Q4H PRN


   PRN Reason: Nausea/Vomiting


Pantoprazole Sodium (Protonix Inj)  40 mg IVP Q12 LUIS ANTONIO


Vitamin A (Vitamin A & D Oint Ud Foilpak)  1 ea TOP Q2 PRN


   PRN Reason: Dry mouth





Discontinued Medications





Diphenhydramine HCl (Benadryl)  25 mg IVP ONCE ONE


   Stop: 01/11/18 04:00


   Last Admin: 01/11/18 04:12  Dose: 25 mg





IVP Administration


 Document     01/11/18 04:12  IT  (Rec: 01/11/18 04:12  IT  [id number]


     Charges for Administration


      # of IVP Administrations                   1





Famotidine (Pepcid)  20 mg IVP STAT STA


   Stop: 01/11/18 02:31


   Last Admin: 01/11/18 03:03  Dose: 20 mg





IVP Administration


 Document     01/11/18 03:03  IT  (Rec: 01/11/18 03:03  IT  [id number]


     Charges for Administration


      # of IVP Administrations                   1





Hydromorphone HCl (Dilaudid)  1 mg IVP STAT STA


   Stop: 01/11/18 04:05


   Last Admin: 01/11/18 04:11  Dose: 1 mg





MAR Pain Assessment


 Document     01/11/18 04:11  IT  (Rec: 01/11/18 04:12  IT  [id number]


     Pain Reassessment


      Is this a pain reassessment?               No


     Sleep


      Is patient sleeping during reassessment?   No


     Presence of Pain


      Presence of Pain                           Yes


     Location


      Pain Location Body Site                    Generalized


IVP Administration


 Document     01/11/18 04:11  IT  (Rec: 01/11/18 04:12  IT  [id number]


     Charges for Administration


      # of IVP Administrations                   1





Sodium Chloride (Sodium Chloride 0.9%)  1,000 mls @ 999 mls/hr IV .Q1H1M STA


   Stop: 01/11/18 03:30


   Last Admin: 01/11/18 03:03  Dose: 999 mls/hr





eMAR Start Stop


 Document     01/11/18 03:03  IT  (Rec: 01/11/18 03:03  IT  [id number]


     Intravenous Solution


      Start Date                                 01/11/18


      Start Time                                 03:03


      End Date                                   01/11/18


      End time                                   04:03


      Total Infusion Time                        60





Ketorolac Tromethamine (Toradol)  30 mg IVP ONCE ONE


   Stop: 01/11/18 02:31


   Last Admin: 01/11/18 03:03  Dose: 30 mg





MAR Pain Assessment


 Document     01/11/18 03:03  IT  (Rec: 01/11/18 03:03  IT  [id number]


     Pain Reassessment


      Is this a pain reassessment?               No


     Sleep


      Is patient sleeping during reassessment?   No


     Presence of Pain


      Presence of Pain                           Yes


IVP Administration


 Document     01/11/18 03:03  IT  (Rec: 01/11/18 03:03  IT  [id number]


     Charges for Administration


      # of IVP Administrations                   1





Ondansetron HCl (Zofran Inj)  4 mg IVP ONCE ONE


   Stop: 01/11/18 02:31


   Last Admin: 01/11/18 03:03  Dose: 4 mg





IVP Administration


 Document     01/11/18 03:03  IT  (Rec: 01/11/18 03:04  IT  [id number]


     Charges for Administration


      # of IVP Administrations                   1














- Scribe Statement


The provider has reviewed the documentation as recorded by the Sharda Cai





Provider Scribe Attestation:


All medical record entries made by the Scribe were at my direction and 

personally dictated by me. I have reviewed the chart and agree that the record 

accurately reflects my personal performance of the history, physical exam, 

medical decision making, and the department course for this patient. I have 

also personally directed, reviewed, and agree with the discharge instructions 

and disposition.








Disposition/Present on Arrival





- Present on Arrival


Any Indicators Present on Arrival: No


History of DVT/PE: No


History of Uncontrolled Diabetes: Yes


Urinary Catheter: No


History of Decub. Ulcer: No


History Surgical Site Infection Following: None





- Disposition


Have Diagnosis and Disposition been Completed?: Yes


Diagnosis: 


 Gastroparesis, Persistent vomiting, Abdominal pain





Disposition: HOSPITALIZED


Disposition Time: 04:07


Patient Problems: 


 Current Active Problems











Problem Status Onset


 


Abdominal pain Acute  


 


Gastroparesis Acute  


 


Persistent vomiting Chronic  











Condition: STABLE

## 2018-01-11 NOTE — CP.PCM.HP
<PendletonChavez - Last Filed: 01/11/18 06:30>





History of Present Illness





- History of Present Illness


History of Present Illness: 


Ms. Arthur is a 27 year old female with a past medical history significant for 

DM(type I), GERD, gastroparesis, HTN, and sickle cell trait who presents with 

intractable abdominal pain, nausea, and vomiting with inability to tolerate PO 

for the past four days. She has had similar symptoms intermittently for about 4 

years. She describes the abdominal pain as diffuse and burning in nature, with 

occasional radiation substernally. She reports that her pain is worse when she 

tries to eat and when she lays flat. She reports 7 episodes of non-bloody non-

bilious vomiting daily for the past four days. She also endorses one episode of 

non-bloody diarrhea earlier today but that she has had three solid bowel 

movements since that time as well as a chronic cough productive of clear sputum 

with intermittent production of green sputum during the winter months. She 

denies fever, chills, headache, changes in her vision, rhinorrhea, ear pain/

discharge, sore throat, dysphagia, chest pain, palpitations, leg swelling, 

orthopnea, SOB, cough, wheezing, hemoptysis, hematemesis, constipation, melena, 

hematochezia, burning/pain with urination, urinary frequency, hematuria, skin 

changes, joint pain, or any numbness/tingling/weakness of any extremity.





PMH: DM type I (diagnosed age 9), GERD, gastroparesis, HTN, and sickle cell 

trait


PSH: Denies


Family History: DM in multiple family members. Breast CA in multiple family 

members. Sickle cell disease and trait in multiple family members.


Social History: Endorses smoking 3 cigarettes daily for 17 years; denies 

alcohol or illicit drug use; Currently sexually active with one male partner


Allergies: NKDA


Home Medications: As per MAR





Present on Admission





- Present on Admission


Any Indicators Present on Admission: No





Review of Systems





- Review of Systems


Review of Systems: 


As stated in HPI, otherwise negative





Past Patient History





- Infectious Disease


Hx of Infectious Diseases: None





- Tetanus Immunizations


Tetanus Immunization: Unknown





- Past Medical History & Family History


Past Medical History?: Yes





- Past Social History


Smoking Status: Light Smoker < 10 Cigarettes Daily





- CARDIAC


Hx Hypertension: Yes





- PULMONARY


Hx Respiratory Disorders: No





- NEUROLOGICAL


Hx Neurological Disorder: No





- HEENT


Hx HEENT Problems: No





- RENAL


Hx Chronic Kidney Disease: No





- ENDOCRINE/METABOLIC


Hx Diabetes Mellitus Type 1: Yes (dx 9 yrs old)





- HEMATOLOGICAL/ONCOLOGICAL


Hx Sickle Cell Disease: Yes ("JUST FOUND OUT A MONTH AGO")





- INTEGUMENTARY


Hx Dermatological Problems: No





- MUSCULOSKELETAL/RHEUMATOLOGICAL


Hx Osteoporosis: Yes (pt unsure of this hx)





- GASTROINTESTINAL


Hx Gastritis: Yes





- GENITOURINARY/GYNECOLOGICAL


Hx Genitourinary Disorders: No





- PSYCHIATRIC


Hx Anxiety: Yes


Hx Depression: Yes


Hx Substance Use: Yes





- SURGICAL HISTORY


Hx Surgeries: No





- ANESTHESIA


Hx Anesthesia: Yes


Hx Anesthesia Reactions: No


Hx Malignant Hyperthermia: No





Meds


Allergies/Adverse Reactions: 


 Allergies











Allergy/AdvReac Type Severity Reaction Status Date / Time


 


No Known Allergies Allergy   Verified 01/11/18 02:16














Physical Exam





- Constitutional


Appears: Non-toxic, No Acute Distress





- Head Exam


Head Exam: ATRAUMATIC, NORMAL INSPECTION, NORMOCEPHALIC





- Eye Exam


Eye Exam: EOMI, Normal appearance, PERRL.  absent: Conjunctival injection, 

Nystagmus, Periorbital swelling, Periorbital tenderness, Scleral icterus


Pupil Exam: NORMAL ACCOMODATION, PERRL.  absent: Fixed, Irregular, Miosis, 

Mydriatic, Unequal





- ENT Exam


ENT Exam: Mucous Membranes Dry, Normal Exam, Normal External Ear Exam, Normal 

Oropharynx.  absent: Mucous Membranes Moist





- Neck Exam


Neck exam: Positive for: Full Rom, Normal Inspection.  Negative for: 

Lymphadenopathy, Meningismus, Tenderness, Thyromegaly





- Respiratory Exam


Respiratory Exam: Clear to Auscultation Bilateral, NORMAL BREATHING PATTERN.  

absent: Accessory Muscle Use, Chest Wall Tenderness, Decreased Breath Sounds, 

Prolonged Expiratory Phase, Rales, Rhonchi, Wheezes, Respiratory Distress, 

Stridor





- Cardiovascular Exam


Cardiovascular Exam: REGULAR RHYTHM, RRR, +S1, +S2.  absent: Bradycardia, 

Tachycardia, Clicks, Diastolic murmur, Gallop, Irregular Rhythm, JVD, Rubs, +S4

, Systolic Murmur





- GI/Abdominal Exam


GI & Abdominal Exam: Normal Bowel Sounds, Soft, Tenderness (Minimal diffuse 

tenderness to deep palpation).  absent: Bruit, Diminished Bowel Sounds, 

Distended, Firm, Guarding, Hernia, Hyperactive Bowel Sounds, Hypoactive Bowel 

Sounds, Mass, Organomegaly, Pulsatile Mass, Rebound, Rigid





- Extremities Exam


Extremities exam: Positive for: full ROM, normal capillary refill, normal 

inspection, pedal pulses present.  Negative for: calf tenderness, joint swelling

, pedal edema, tenderness





- Back Exam


Back exam: FULL ROM, NORMAL INSPECTION.  absent: CVA tenderness (L), CVA 

tenderness (R), muscle spasm, paraspinal tenderness, rash noted, tenderness, 

vertebral tenderness





- Neurological Exam


Neurological exam: Alert, CN II-XII Intact, Normal Gait, Oriented x3





- Psychiatric Exam


Psychiatric exam: Normal Affect, Normal Mood





- Skin


Skin Exam: Dry, Intact, Normal Color, Warm





Results





- Vital Signs


Recent Vital Signs: 





 Last Vital Signs











Temp  98.4 F   01/11/18 03:17


 


Pulse  83   01/11/18 03:17


 


Resp  18   01/11/18 03:17


 


BP  157/106 H  01/11/18 03:17


 


Pulse Ox  98   01/11/18 03:17














- Labs


Result Diagrams: 


 01/11/18 02:52





 01/11/18 02:52





Assessment & Plan





- Assessment and Plan (Free Text)


Assessment: 


27 year old female with a past medical history significant for DM(type I), GERD

, gastroparesis, HTN, and sickle cell trait who presents with intractable 

abdominal pain, nausea, and vomiting with inability to tolerate PO for the past 

four days.


Plan: 


1. Gastroparesis with intractable nausea and vomiting


-Reglan 10mg IVP Q8H


-Zofran 4mg IVP Q4H PRN for N/V


-Toradol 15mg IM Q6H PRN for pain control


-NS at 100mls/hr





2. History of DM


-SSI-Low and Accuchecks ACHS


-Heart Healthy Moderate Carbohydrate Consistent Diet





GI Prophylaxis: Protonix


DVT Prophylaxis: SCD's





Patient seen and case discussed with attending, Dr. Townsend.





- Date & Time


Date: 01/11/18


Time: 05:04





Decision To Admit





- Pt Status Changed To:


Hospital Disposition Of: Observation





- .


Bed Request Type: Med/Surg





<Alex Townsend MD - Last Filed: 01/13/18 10:17>





Results





- Vital Signs


Recent Vital Signs: 





 Last Vital Signs











Temp  98.6 F   01/12/18 07:30


 


Pulse  75   01/12/18 07:30


 


Resp  20   01/12/18 07:30


 


BP  135/86   01/12/18 07:30


 


Pulse Ox  99   01/12/18 07:30














- Labs


Result Diagrams: 


 01/12/18 06:15





 01/12/18 06:15


Labs: 





 Laboratory Results - last 24 hr











  01/12/18





  11:33


 


POC Glucose (mg/dL)  135 H














Attending/Attestation





- Attestation


I have personally seen and examined this patient.: Yes


I have fully participated in the care of the patient.: Yes


I have reviewed all pertinent clinical information: Yes


Notes (Text): 








-I agree with the above H&P completed by the resident physician.

## 2018-01-11 NOTE — RAD
HISTORY:

sputum, cough , elevated wbc  



COMPARISON:

12/26/2017



TECHNIQUE:

Chest PA and lateral



FINDINGS:



LUNGS:

No active pulmonary disease.



PLEURA:

No significant pleural effusion identified. No pneumothorax apparent.



CARDIOVASCULAR:

Normal.



OSSEOUS STRUCTURES:

No significant abnormalities.



VISUALIZED UPPER ABDOMEN:

Normal.



OTHER FINDINGS:

None.



IMPRESSION:

No active disease.

## 2018-01-12 VITALS
DIASTOLIC BLOOD PRESSURE: 86 MMHG | SYSTOLIC BLOOD PRESSURE: 135 MMHG | TEMPERATURE: 98.6 F | HEART RATE: 75 BPM | OXYGEN SATURATION: 99 %

## 2018-01-12 LAB
ALBUMIN SERPL-MCNC: 4.2 G/DL (ref 3–4.8)
ALBUMIN/GLOB SERPL: 1.4 {RATIO} (ref 1.1–1.8)
ALT SERPL-CCNC: 44 U/L (ref 7–56)
AST SERPL-CCNC: 39 U/L (ref 14–36)
BASOPHILS # BLD AUTO: 0.02 K/MM3 (ref 0–2)
BASOPHILS NFR BLD: 0.2 % (ref 0–3)
BUN SERPL-MCNC: 12 MG/DL (ref 7–21)
CALCIUM SERPL-MCNC: 8.7 MG/DL (ref 8.4–10.5)
EOSINOPHIL # BLD: 0 10*3/UL (ref 0–0.7)
EOSINOPHIL NFR BLD: 0.2 % (ref 1.5–5)
ERYTHROCYTE [DISTWIDTH] IN BLOOD BY AUTOMATED COUNT: 13.8 % (ref 11.5–14.5)
GFR NON-AFRICAN AMERICAN: > 60
GRANULOCYTES # BLD: 6.03 10*3/UL (ref 1.4–6.5)
GRANULOCYTES NFR BLD: 67.6 % (ref 50–68)
HGB BLD-MCNC: 11.8 G/DL (ref 12–16)
LYMPHOCYTES # BLD: 2.1 10*3/UL (ref 1.2–3.4)
LYMPHOCYTES NFR BLD AUTO: 24 % (ref 22–35)
MCH RBC QN AUTO: 27 PG (ref 25–35)
MCHC RBC AUTO-ENTMCNC: 34.2 G/DL (ref 31–37)
MCV RBC AUTO: 78.9 FL (ref 80–105)
MONOCYTES # BLD AUTO: 0.7 10*3/UL (ref 0.1–0.6)
MONOCYTES NFR BLD: 8 % (ref 1–6)
PLATELET # BLD: 265 10^3/UL (ref 120–450)
PMV BLD AUTO: 9.5 FL (ref 7–11)
RBC # BLD AUTO: 4.37 10^6/UL (ref 3.5–6.1)
WBC # BLD AUTO: 8.9 10^3/UL (ref 4.5–11)

## 2018-01-12 RX ADMIN — INSULIN HUMAN SCH: 100 INJECTION, SOLUTION PARENTERAL at 12:18

## 2018-01-12 RX ADMIN — INSULIN HUMAN SCH: 100 INJECTION, SOLUTION PARENTERAL at 08:01

## 2018-01-12 NOTE — CP.PCM.DIS
<Candelario Martinez - Last Filed: 01/12/18 15:41>





Provider





- Provider


Date of Admission: 


01/11/18 04:11





Attending physician: 


Jordan Osorio MD





Time Spent in preparation of Discharge (in minutes): 45





Diagnosis





- Discharge Diagnosis


(1) Diabetes mellitus


Status: Chronic   Priority: Medium   





(2) Cyclical vomiting syndrome


Status: Acute   Priority: High   





(3) Gastroparesis


Status: Chronic   Priority: Medium   





Hospital Course





- Lab Results


Lab Results: 


 Most Recent Lab Values











WBC  8.9 10^3/ul (4.5-11.0)  D 01/12/18  06:15    


 


RBC  4.37 10^6/uL (3.5-6.1)   01/12/18  06:15    


 


Hgb  11.8 g/dL (12.0-16.0)  L  01/12/18  06:15    


 


Hct  34.5 % (36.0-48.0)  L  01/12/18  06:15    


 


MCV  78.9 fl (80.0-105.0)  L  01/12/18  06:15    


 


MCH  27.0 pg (25.0-35.0)   01/12/18  06:15    


 


MCHC  34.2 g/dl (31.0-37.0)   01/12/18  06:15    


 


RDW  13.8 % (11.5-14.5)   01/12/18  06:15    


 


Plt Count  265 10^3/uL (120.0-450.0)   01/12/18  06:15    


 


MPV  9.5 fl (7.0-11.0)   01/12/18  06:15    


 


Gran %  67.6 % (50.0-68.0)   01/12/18  06:15    


 


Lymph % (Auto)  24.0 % (22.0-35.0)   01/12/18  06:15    


 


Mono % (Auto)  8.0 % (1.0-6.0)  H  01/12/18  06:15    


 


Eos % (Auto)  0.2 % (1.5-5.0)  L  01/12/18  06:15    


 


Baso % (Auto)  0.2 % (0.0-3.0)   01/12/18  06:15    


 


Gran #  6.03  (1.4-6.5)   01/12/18  06:15    


 


Lymph #  2.1  (1.2-3.4)   01/12/18  06:15    


 


Mono #  0.7  (0.1-0.6)  H  01/12/18  06:15    


 


Eos #  0.0  (0.0-0.7)   01/12/18  06:15    


 


Baso #  0.02 K/mm3 (0.0-2.0)   01/12/18  06:15    


 


Sodium  133 mmol/L (132-148)   01/12/18  06:15    


 


Potassium  3.7 mmol/L (3.6-5.0)   01/12/18  06:15    


 


Chloride  96 mmol/L ()  L  01/12/18  06:15    


 


Carbon Dioxide  27 mmol/L (21-33)   01/12/18  06:15    


 


Anion Gap  14  (10-20)   01/12/18  06:15    


 


BUN  12 mg/dL (7-21)   01/12/18  06:15    


 


Creatinine  0.7 mg/dl (0.7-1.2)   01/12/18  06:15    


 


Est GFR ( Amer)  > 60   01/12/18  06:15    


 


Est GFR (Non-Af Amer)  > 60   01/12/18  06:15    


 


POC Glucose (mg/dL)  135 mg/dL ()  H  01/12/18  11:33    


 


Random Glucose  138 mg/dL ()  H  01/12/18  06:15    


 


Calcium  8.7 mg/dL (8.4-10.5)   01/12/18  06:15    


 


Total Bilirubin  0.8 mg/dL (0.2-1.3)   01/12/18  06:15    


 


AST  39 U/L (14-36)  H  01/12/18  06:15    


 


ALT  44 U/L (7-56)   01/12/18  06:15    


 


Alkaline Phosphatase  42 U/L ()   01/12/18  06:15    


 


Total Protein  7.3 g/dL (5.8-8.3)   01/12/18  06:15    


 


Albumin  4.2 g/dL (3.0-4.8)   01/12/18  06:15    


 


Globulin  3.1 gm/dL  01/12/18  06:15    


 


Albumin/Globulin Ratio  1.4  (1.1-1.8)   01/12/18  06:15    


 


Lipase  52 U/L ()   01/11/18  02:52    


 


Urine Color  Yellow  (YELLOW)   01/11/18  03:02    


 


Urine Appearance  Clear  (CLEAR)   01/11/18  03:02    


 


Urine pH  6.0  (4.7-8.0)   01/11/18  03:02    


 


Ur Specific Gravity  1.025  (1.005-1.035)   01/11/18  03:02    


 


Urine Protein  100 mg/dL (<30 mg/dL)  H  01/11/18  03:02    


 


Urine Glucose (UA)  Negative mg/dL (NEGATIVE)   01/11/18  03:02    


 


Urine Ketones  Negative mg/dL (NEGATIVE)   01/11/18  03:02    


 


Urine Blood  Moderate  (NEGATIVE)  H  01/11/18  03:02    


 


Urine Nitrate  Negative  (NEGATIVE)   01/11/18  03:02    


 


Urine Bilirubin  Negative  (NEGATIVE)   01/11/18  03:02    


 


Urine Urobilinogen  0.2 E.U./dL (<1 E.U./dL)   01/11/18  03:02    


 


Ur Leukocyte Esterase  Negative Emma/uL (NEGATIVE)   01/11/18  03:02    


 


Urine RBC  2 - 5 /hpf (0-2)   01/11/18  03:02    


 


Urine WBC  0 - 2 /hpf (0-6)   01/11/18  03:02    


 


Ur Epithelial Cells  0 - 2 /hpf (0-5)   01/11/18  03:02    


 


Urine Bacteria  Few  (NEG)   01/11/18  03:02    


 


Urine HCG, Qual  Negative  (NEGATIVE)   01/11/18  03:02    














- Hospital Course


Hospital Course: 





Patient is a 27 year old female with a past medical history significant for non 

insulin dependent diabetes mellitus, gastroparesis, hypertension, and sickle 

cell trait who presents with intractable abdominal pain, nausea, and vomiting 

with inability to tolerate per oral intake for the past four days. She has had 

similar symptoms intermittently for about 4 years. On previous admission, a 

diagnosis of cyclical vomiting syndrome was given. Patient was given 

amitriptyline and observed; patient's chronic pain, nausea, and vomiting all 

resolved with first dose. Patient was then discharged with a prescription 

however patient has returned to the emergency department with the same symptoms 

since her prescription has finished. Patient initially had an elevated white 

blood cell count along with productive cough. CT chest revealed infiltrates 

which may have a correlation with an inflammatory condition for which patient 

was given the CT report and instructed to follow up with her primary care 

physician for further evaluation as well as prescribed antibiotics. Patient was 

given a prescription for amitriptyline and instructed to follow up with her 

primary care physician since her prescription will only last for a short period 

of time. Patient also brought up her concern with her gastroparesis. Discussed 

with patient the effect opioid use has on gut motility as well as diabetes 

mellitus. Also discussed with patient regarding motility studies that can be 

done on an outpatient basis at [facility] in [place], [place]; patient stated she will follow up with this recommendation and discuss 

with her primary physician. Patient stated she felt much better with resolving 

of pain, nausea and vomiting and was ready to go home. Discharge plan discussed 

in full detail with patient, attending, and myself. Patient was cleared for 

discharge and then discharged. 








Case discussed and Reviewed with Dr. Jo Martinez PGY1





Discharge Exam





- Head Exam


Head Exam: ATRAUMATIC, NORMAL INSPECTION, NORMOCEPHALIC





Discharge Plan





- Discharge Medications


Prescriptions: 


Amitriptyline [Elavil] 10 mg PO Q6 10 Days #40 tab


Docusate [Colace] 100 mg PO DAILY #10 cap


Levofloxacin [Levaquin] 750 mg PO DAILY #5 tablet


Magnesium Citrate [[facility] Magnesium Citrate] 300 ml PO DAILY #

10 bottle


Polyethylene Glycol 3350 [Miralax] 17 gm PO BID 10 Days #20 ml





- Follow Up Plan


Condition: STABLE


Disposition: HOME/ ROUTINE


Instructions:  Pneumococcal Vaccine for Adults (DC), Influenza Vaccine (DC), 

Gastroparesis (DC)


Additional Instructions: 


1. Please follow up with your PMD within 3-5 days regarding medication for your 

cyclical vomiting syndrome (Elavil) as well as CT chest findings for further 

evaluation with referral to specialist if needed


2. Please fill and take prescriptions as prescribed.


3. Please discuss with your PMD regarding further evaluation of gastroparesis 

with motility studied at [facility] [place], [place] as we discussed.





<Jordan Osorio - Last Filed: 01/12/18 16:43>





Provider





- Provider


Date of Admission: 


01/11/18 04:11





Attending physician: 


Jordan Osorio MD








Hospital Course





- Lab Results


Lab Results: 


 Most Recent Lab Values











WBC  8.9 10^3/ul (4.5-11.0)  D 01/12/18  06:15    


 


RBC  4.37 10^6/uL (3.5-6.1)   01/12/18  06:15    


 


Hgb  11.8 g/dL (12.0-16.0)  L  01/12/18  06:15    


 


Hct  34.5 % (36.0-48.0)  L  01/12/18  06:15    


 


MCV  78.9 fl (80.0-105.0)  L  01/12/18  06:15    


 


MCH  27.0 pg (25.0-35.0)   01/12/18  06:15    


 


MCHC  34.2 g/dl (31.0-37.0)   01/12/18  06:15    


 


RDW  13.8 % (11.5-14.5)   01/12/18  06:15    


 


Plt Count  265 10^3/uL (120.0-450.0)   01/12/18  06:15    


 


MPV  9.5 fl (7.0-11.0)   01/12/18  06:15    


 


Gran %  67.6 % (50.0-68.0)   01/12/18  06:15    


 


Lymph % (Auto)  24.0 % (22.0-35.0)   01/12/18  06:15    


 


Mono % (Auto)  8.0 % (1.0-6.0)  H  01/12/18  06:15    


 


Eos % (Auto)  0.2 % (1.5-5.0)  L  01/12/18  06:15    


 


Baso % (Auto)  0.2 % (0.0-3.0)   01/12/18  06:15    


 


Gran #  6.03  (1.4-6.5)   01/12/18  06:15    


 


Lymph #  2.1  (1.2-3.4)   01/12/18  06:15    


 


Mono #  0.7  (0.1-0.6)  H  01/12/18  06:15    


 


Eos #  0.0  (0.0-0.7)   01/12/18  06:15    


 


Baso #  0.02 K/mm3 (0.0-2.0)   01/12/18  06:15    


 


Sodium  133 mmol/L (132-148)   01/12/18  06:15    


 


Potassium  3.7 mmol/L (3.6-5.0)   01/12/18  06:15    


 


Chloride  96 mmol/L ()  L  01/12/18  06:15    


 


Carbon Dioxide  27 mmol/L (21-33)   01/12/18  06:15    


 


Anion Gap  14  (10-20)   01/12/18  06:15    


 


BUN  12 mg/dL (7-21)   01/12/18  06:15    


 


Creatinine  0.7 mg/dl (0.7-1.2)   01/12/18  06:15    


 


Est GFR ( Amer)  > 60   01/12/18  06:15    


 


Est GFR (Non-Af Amer)  > 60   01/12/18  06:15    


 


POC Glucose (mg/dL)  135 mg/dL ()  H  01/12/18  11:33    


 


Random Glucose  138 mg/dL ()  H  01/12/18  06:15    


 


Calcium  8.7 mg/dL (8.4-10.5)   01/12/18  06:15    


 


Total Bilirubin  0.8 mg/dL (0.2-1.3)   01/12/18  06:15    


 


AST  39 U/L (14-36)  H  01/12/18  06:15    


 


ALT  44 U/L (7-56)   01/12/18  06:15    


 


Alkaline Phosphatase  42 U/L ()   01/12/18  06:15    


 


Total Protein  7.3 g/dL (5.8-8.3)   01/12/18  06:15    


 


Albumin  4.2 g/dL (3.0-4.8)   01/12/18  06:15    


 


Globulin  3.1 gm/dL  01/12/18  06:15    


 


Albumin/Globulin Ratio  1.4  (1.1-1.8)   01/12/18  06:15    


 


Lipase  52 U/L ()   01/11/18  02:52    


 


Urine Color  Yellow  (YELLOW)   01/11/18  03:02    


 


Urine Appearance  Clear  (CLEAR)   01/11/18  03:02    


 


Urine pH  6.0  (4.7-8.0)   01/11/18  03:02    


 


Ur Specific Gravity  1.025  (1.005-1.035)   01/11/18  03:02    


 


Urine Protein  100 mg/dL (<30 mg/dL)  H  01/11/18  03:02    


 


Urine Glucose (UA)  Negative mg/dL (NEGATIVE)   01/11/18  03:02    


 


Urine Ketones  Negative mg/dL (NEGATIVE)   01/11/18  03:02    


 


Urine Blood  Moderate  (NEGATIVE)  H  01/11/18  03:02    


 


Urine Nitrate  Negative  (NEGATIVE)   01/11/18  03:02    


 


Urine Bilirubin  Negative  (NEGATIVE)   01/11/18  03:02    


 


Urine Urobilinogen  0.2 E.U./dL (<1 E.U./dL)   01/11/18  03:02    


 


Ur Leukocyte Esterase  Negative Emma/uL (NEGATIVE)   01/11/18  03:02    


 


Urine RBC  2 - 5 /hpf (0-2)   01/11/18  03:02    


 


Urine WBC  0 - 2 /hpf (0-6)   01/11/18  03:02    


 


Ur Epithelial Cells  0 - 2 /hpf (0-5)   01/11/18  03:02    


 


Urine Bacteria  Few  (NEG)   01/11/18  03:02    


 


Urine HCG, Qual  Negative  (NEGATIVE)   01/11/18  03:02    














Attending/Attestation





- Attestation


I have personally seen and examined this patient.: Yes


I have fully participated in the care of the patient.: Yes


I have reviewed all pertinent clinical information, including history, physical 

exam and plan: Yes


Notes (Text): 





01/12/18 16:40





Attending note;





Patient seen and examined with resident.





Patient is a 27-year-old female with a past medical history of diabetes, 

gastroparesis is admitted with nausea and vomiting.


Possible diabetic gastroparesis.


Currently asymptomatic. Tolerating diet.


Patient is strongly advised to control the sugar. Strongly advised to avoid 

opiates.


Patient responded to amitriptyline well previously.





Patient was advised to follow-up with [facility] clinic and motility studies.


Patient also follows up with GI Dr. Luu As outpatient. 





Pneumonia clinically stable. Will be discharged home with by mouth Levaquin.





Follow-up with PMD Dr. Salazar Gonzales.














01/12/18 16:43

## 2018-01-12 NOTE — CT
PROCEDURE:  CT Chest without contrast



HISTORY:

follow up cxr



COMPARISON:

None.



TECHNIQUE:

Contiguous axial images were obtained through the chest without 

intravenous contrast enhancement. Sagittal and coronal 

reconstructions were performed.







Radiation dose (DLP):  mGy-cm. 



This CT exam was performed using one or more of the following dose 

reduction techniques: Automated exposure control, adjustment of the 

mA and/or kV according to patient size, and/or use of iterative 

reconstruction technique.



FINDINGS:



LUNGS:

There is mild interstitial change with scattered ground-glass density 

infiltrates in the medial aspect of the right upper lobe, as well as 

along the right lower lobe, medial segment. Scattered intra 

parenchymal cyst formation is identified. 



MEDIASTINUM:

Unremarkable thoracic aorta. No aneurysm. Normal sized heart. Main 

pulmonary artery unremarkable. No vascular congestion. No 

lymphadenopathy.



PLEURA:

No pleural fluid. No pneumothorax.



BONES:

No fracture. No destructive lesion. 



UPPER ABDOMEN:

Grossly unremarkable.



OTHER FINDINGS:

None.



IMPRESSION:

Mild ground-glass density infiltrates in the right upper lobe and 

right lower lobe as well as scattered cystic foci within the lung 

parenchyma. Findings could represent underlying inflammatory 

disorder. Recommend clinical correlation and follow-up.

## 2018-02-10 ENCOUNTER — HOSPITAL ENCOUNTER (EMERGENCY)
Dept: HOSPITAL 42 - ED | Age: 28
Discharge: HOME | End: 2018-02-10
Payer: COMMERCIAL

## 2018-02-10 VITALS — BODY MASS INDEX: 23.8 KG/M2

## 2018-02-10 VITALS
DIASTOLIC BLOOD PRESSURE: 110 MMHG | OXYGEN SATURATION: 97 % | SYSTOLIC BLOOD PRESSURE: 133 MMHG | TEMPERATURE: 98.7 F | HEART RATE: 79 BPM

## 2018-02-10 VITALS — RESPIRATION RATE: 18 BRPM

## 2018-02-10 DIAGNOSIS — E11.43: Primary | ICD-10-CM

## 2018-02-10 DIAGNOSIS — I10: ICD-10-CM

## 2018-02-10 DIAGNOSIS — Z87.891: ICD-10-CM

## 2018-02-10 DIAGNOSIS — D64.9: ICD-10-CM

## 2018-02-10 DIAGNOSIS — R10.9: ICD-10-CM

## 2018-02-10 DIAGNOSIS — K31.84: ICD-10-CM

## 2018-02-10 LAB
ALBUMIN SERPL-MCNC: 4.5 G/DL (ref 3–4.8)
ALBUMIN/GLOB SERPL: 1.5 {RATIO} (ref 1.1–1.8)
ALT SERPL-CCNC: 52 U/L (ref 7–56)
ANISOCYTOSIS BLD QL SMEAR: (no result)
APPEARANCE UR: (no result)
APTT BLD: 20.9 SECONDS (ref 25.1–36.5)
AST SERPL-CCNC: 34 U/L (ref 14–36)
BACTERIA #/AREA URNS HPF: (no result) /[HPF]
BASOPHILS # BLD AUTO: 0.02 K/MM3 (ref 0–2)
BASOPHILS NFR BLD: 0.1 % (ref 0–3)
BILIRUB UR-MCNC: NEGATIVE MG/DL
BUN SERPL-MCNC: 9 MG/DL (ref 7–21)
CALCIUM SERPL-MCNC: 9.4 MG/DL (ref 8.4–10.5)
COLOR UR: YELLOW
EOSINOPHIL # BLD: 0 10*3/UL (ref 0–0.7)
EOSINOPHIL NFR BLD: 0.1 % (ref 1.5–5)
ERYTHROCYTE [DISTWIDTH] IN BLOOD BY AUTOMATED COUNT: 13.6 % (ref 11.5–14.5)
GFR NON-AFRICAN AMERICAN: > 60
GLUCOSE UR STRIP-MCNC: 500 MG/DL
GRANULOCYTES # BLD: 13.51 10*3/UL (ref 1.4–6.5)
GRANULOCYTES NFR BLD: 90.6 % (ref 50–68)
HGB BLD-MCNC: 11.6 G/DL (ref 12–16)
INR PPP: 1.06 (ref 0.93–1.08)
LEUKOCYTE ESTERASE UR-ACNC: NEGATIVE LEU/UL
LIPASE SERPL-CCNC: 47 U/L (ref 23–300)
LYMPHOCYTE: 5 % (ref 22–35)
LYMPHOCYTES # BLD: 0.9 10*3/UL (ref 1.2–3.4)
LYMPHOCYTES NFR BLD AUTO: 6.1 % (ref 22–35)
MCH RBC QN AUTO: 27.6 PG (ref 25–35)
MCHC RBC AUTO-ENTMCNC: 33.7 G/DL (ref 31–37)
MCV RBC AUTO: 81.7 FL (ref 80–105)
MONOCYTE: 3 % (ref 1–6)
MONOCYTES # BLD AUTO: 0.5 10*3/UL (ref 0.1–0.6)
MONOCYTES NFR BLD: 3.1 % (ref 1–6)
NEUTROPHILS NFR BLD AUTO: 92 % (ref 50–70)
PH UR STRIP: 5.5 [PH] (ref 4.7–8)
PLATELET # BLD EST: NORMAL 10*3/UL
PLATELET # BLD: 251 10^3/UL (ref 120–450)
PMV BLD AUTO: 9.3 FL (ref 7–11)
PROT UR STRIP-MCNC: 100 MG/DL
PROTHROMBIN TIME: 12.2 SECONDS (ref 9.4–12.5)
RBC # BLD AUTO: 4.21 10^6/UL (ref 3.5–6.1)
RBC # UR STRIP: (no result) /UL
SP GR UR STRIP: >= 1.03 (ref 1–1.03)
URINE NITRATE: NEGATIVE
UROBILINOGEN UR STRIP-ACNC: 0.2 E.U./DL
WBC # BLD AUTO: 14.9 10^3/UL (ref 4.5–11)
WBC #/AREA URNS HPF: (no result) /HPF (ref 0–6)

## 2018-02-10 PROCEDURE — 96361 HYDRATE IV INFUSION ADD-ON: CPT

## 2018-02-10 PROCEDURE — 99283 EMERGENCY DEPT VISIT LOW MDM: CPT

## 2018-02-10 PROCEDURE — 80053 COMPREHEN METABOLIC PANEL: CPT

## 2018-02-10 PROCEDURE — 85025 COMPLETE CBC W/AUTO DIFF WBC: CPT

## 2018-02-10 PROCEDURE — 96374 THER/PROPH/DIAG INJ IV PUSH: CPT

## 2018-02-10 PROCEDURE — 85610 PROTHROMBIN TIME: CPT

## 2018-02-10 PROCEDURE — 85730 THROMBOPLASTIN TIME PARTIAL: CPT

## 2018-02-10 PROCEDURE — 96372 THER/PROPH/DIAG INJ SC/IM: CPT

## 2018-02-10 PROCEDURE — 96375 TX/PRO/DX INJ NEW DRUG ADDON: CPT

## 2018-02-10 PROCEDURE — 83690 ASSAY OF LIPASE: CPT

## 2018-02-10 PROCEDURE — 81001 URINALYSIS AUTO W/SCOPE: CPT

## 2018-02-10 PROCEDURE — 74019 RADEX ABDOMEN 2 VIEWS: CPT

## 2018-02-10 NOTE — ED PDOC
Arrival/HPI





- General


Chief Complaint: GI Problem


Time Seen by Provider: 02/10/18 09:15


Historian: Patient





- History of Present Illness


Narrative History of Present Illness (Text): 





02/10/18 09:56


26yo female with PMhx of anemia, diabetes, gastroparesis, well known to the ED 

present with complaint of abdominal pain with associated nausea and vomiting 

since Thursday. states she saw her PMD yesterday and was given Zofran and 

protonix. Notes that she is taking it without relieve. She denies diarrhea, 

fever, chills, chest pain, melena, hematemesis, hematuria, any other complaint.





Past Medical History





- Provider Review


Nursing Documentation Reviewed: Yes





- Infectious Disease


Hx of Infectious Diseases: None





- Tetanus Immunization


Tetanus Immunization: Unknown





- Cardiac


Hx Hypertension: Yes





- Pulmonary


Hx Respiratory Disorders: No





- Neurological


Hx Neurological Disorder: No





- HEENT


Hx HEENT Disorder: No





- Renal


Hx Renal Disorder: No





- Endocrine/Metabolic


Hx Diabetes Mellitus Type 2: Yes





- Hematological/Oncological


Hx Sickle Cell Disease: Yes ("JUST FOUND OUT A MONTH AGO")





- Integumentary


Hx Dermatological Disorder: No





- Musculoskeletal/Rheumatological


Hx Osteoporosis: Yes (pt unsure of this hx)





- Gastrointestinal


Hx Gastrointestinal Disorders: Yes


Hx Gastroesophageal Reflux: Yes


Other/Comment: Gastritis





- Genitourinary/Gynecological


Hx Genitourinary Disorders: No





- Psychiatric


Hx Anxiety: Yes


Hx Depression: Yes


Hx Substance Use: Yes





- Past Surgical History


Past Surgical History: Non-Contributing





- Anesthesia


Hx Anesthesia: Yes


Hx Anesthesia Reactions: No


Hx Malignant Hyperthermia: No





- Suicidal Assessment


Feels Threatened In Home Enviroment: No





Family/Social History





- Physician Review


Nursing Documentation Reviewed: Yes


Family/Social History: Unknown Family HX


Smoking Status: Former Smoker


Hx Alcohol Use: No


Hx Substance Use: Yes


Substance used: weed


Hx Substance Use Treatment: No





Allergies/Home Meds


Allergies/Adverse Reactions: 


Allergies





No Known Allergies Allergy (Verified 01/11/18 02:16)


 








Home Medications: 


 Home Meds











 Medication  Instructions  Recorded  Confirmed


 


metFORMIN [glucOPHAGE] 500 mg PO BID 10/19/17 02/10/18














Review of Systems





- Physician Review


All systems were reviewed & negative as marked: Yes





- Review of Systems


Constitutional: Normal


Eyes: Normal


ENT: Normal


Respiratory: Normal


Cardiovascular: Normal


Gastrointestinal: Abdominal Pain, Nausea, Vomiting.  absent: Constipation, 

Diarrhea, Hematochezia, Hematemesis


Genitourinary Female: Normal


Musculoskeletal: Normal


Skin: Normal


Neurological: Normal


Endocrine: Normal


Hemo/Lymphatic: Normal


Psychiatric: Normal





Physical Exam


Vital Signs Reviewed: Yes


Vital Signs











  Temp Pulse Resp BP Pulse Ox


 


 02/10/18 13:27  98.7 F  79  18  133/110 H  97


 


 02/10/18 08:55  97.7 F  70  18  165/96 H  99











Temperature: Afebrile


Blood Pressure: Normal


Pulse: Regular


Respiratory Rate: Normal


Appearance: Positive for: Well-Appearing, Non-Toxic, Comfortable


Pain Distress: None


Mental Status: Positive for: Alert and Oriented X 3


Finger Stick Blood Glucose: 190





- Systems Exam


Head: Present: Atraumatic, Normocephalic


Pupils: Present: PERRL


Extroacular Muscles: Present: EOMI


Conjunctiva: Present: Normal


Mouth: Present: Moist Mucous Membranes


Neck: Present: Normal Range of Motion


Respiratory/Chest: Present: Clear to Auscultation, Good Air Exchange.  No: 

Respiratory Distress, Accessory Muscle Use


Cardiovascular: Present: Regular Rate and Rhythm, Normal S1, S2.  No: Murmurs


Abdomen: Present: Normal Bowel Sounds.  No: Tenderness, Distention, Peritoneal 

Signs


Back: Present: Normal Inspection


Upper Extremity: Present: Normal Inspection.  No: Cyanosis, Edema


Lower Extremity: Present: Normal Inspection.  No: Edema


Neurological: Present: GCS=15, CN II-XII Intact, Speech Normal


Skin: Present: Warm, Dry, Normal Color.  No: Rashes


Psychiatric: Present: Alert, Oriented x 3, Normal Insight, Normal Concentration





Medical Decision Making


ED Course and Treatment: 





02/10/18 16:38


PT presented for stated history. She was noted tolerating juice and sandwich in 

ED. PT however continued to complain of pain and nausea in ED. This is typical 

of mediation seeking behavior. she was advised that she have a chronic pain and 

they is no need of opiate for her treatment. She was afebrile in ED and have a 

GI. She saw her GI last month and scheduled to see the GI again the 17th of 

this month.





OBS series


IMPRESSION:


No significant or acute  findings to account for/ related to the clinical 

presentation.








Pt had a mild leukocytosis, this could be secondary to viral enteritis. Result 

was DW the pt. She already have Reglan, Zofran and protonix at home. she will 

be DC home. Advised to follow BRAT diet. Referred to her GI. TRT ED for any new 

or worsening symptoms. She expressed understanding of all discussion.





- Lab Interpretations


Lab Results: 








 02/10/18 10:00 





 02/10/18 10:00 





 Lab Results





02/10/18 10:00: Sodium 147, Potassium 3.5 L, Chloride 110 H, Carbon Dioxide 21, 

Anion Gap 19, BUN 9, Creatinine 0.6 L, Est GFR ( Amer) > 60, Est GFR (Non

-Af Amer) > 60, Random Glucose 209 H, Calcium 9.4, Total Bilirubin 0.4, AST 34, 

ALT 52, Alkaline Phosphatase 49, Total Protein 7.5, Albumin 4.5, Globulin 3.0, 

Albumin/Globulin Ratio 1.5, Lipase 47


02/10/18 10:00: PT 12.2, INR 1.06, APTT 20.9 L


02/10/18 10:00: WBC 14.9 H D, RBC 4.21, Hgb 11.6 L, Hct 34.4 L, MCV 81.7, MCH 

27.6, MCHC 33.7, RDW 13.6, Plt Count 251, MPV 9.3, Gran % 90.6 H, Lymph % (Auto

) 6.1 L, Mono % (Auto) 3.1, Eos % (Auto) 0.1 L, Baso % (Auto) 0.1, Gran # 13.51 

H, Lymph # (Auto) 0.9 L, Mono # (Auto) 0.5, Eos # (Auto) 0.0, Baso # (Auto) 0.02

, Neutrophils % (Manual) 92 H, Lymphocytes % (Manual) 5 L, Monocytes % (Manual) 

3, Platelet Evaluation Normal, Anisocytosis (manual) 1+


02/10/18 09:20: Urine Color Yellow, Urine Appearance Sl cloudy, Urine pH 5.5, 

Ur Specific Gravity >= 1.030, Urine Protein 100 H, Urine Glucose (UA) 500 H, 

Urine Ketones Trace H, Urine Blood Small H, Urine Nitrate Negative, Urine 

Bilirubin Negative, Urine Urobilinogen 0.2, Ur Leukocyte Esterase Negative, 

Urine RBC 1 - 3, Urine WBC 0 - 2, Ur Epithelial Cells 3 - 4, Urine Bacteria Occ











- RAD Interpretation


Radiology Orders: 








02/10/18 10:58


ABDOMEN PORTABLE 2 VIEWS [RAD] Stat 














- Medication Orders


Current Medication Orders: 











Discontinued Medications





Dicyclomine HCl (Bentyl)  10 mg PO ONCE STA


   Stop: 02/10/18 10:40


   Last Admin: 02/10/18 11:01  Dose: 10 mg





Famotidine (Pepcid)  20 mg IVP STAT STA


   Stop: 02/10/18 09:40


   Last Admin: 02/10/18 10:01  Dose: 20 mg





IVP Administration


 Document     02/10/18 10:01  ANTONELLA  (Rec: 02/10/18 10:02  [id number]


     Charges for Administration


      # of IVP Administrations                   1





Sodium Chloride (Sodium Chloride 0.9%)  1,000 mls @ 1,000 mls/hr IV .Q1H STA


   Stop: 02/10/18 10:38


   Last Admin: 02/10/18 10:02  Dose: 1,000 mls/hr





eMAR Start Stop


 Document     02/10/18 10:02  ANTONELLA  (Rec: 02/10/18 10:02  [id number])


     Intravenous Solution


      Start Date                                 02/10/18


      Start Time                                 10:02


      End Date                                   02/10/18


      End time                                   11:02


      Total Infusion Time                        60





Ketorolac Tromethamine (Toradol)  30 mg IVP STAT STA


   Stop: 02/10/18 09:40


   Last Admin: 02/10/18 10:01  Dose: 30 mg





MAR Pain Assessment


 Document     02/10/18 10:01  ANTONELLA  (Rec: 02/10/18 10:01  [id number])


     Pain Reassessment


      Is this a pain reassessment?               Yes


     Presence of Pain


      Presence of Pain                           Yes


     Pain Scale Used


      Pain Scale Used                            Numeric


     Location


      Pain Location Body Site                    Generalized


     Description


      Intensity of Pain at present               10


IVP Administration


 Document     02/10/18 10:01  ANTONELLA  (Rec: 02/10/18 10:01  [id number])


     Charges for Administration


      # of IVP Administrations                   1





Lorazepam (Ativan)  1 mg IVP ONCE ONE


   PRN Reason: Protocol


   Stop: 02/10/18 12:29


   Last Admin: 02/10/18 12:45  Dose: 1 mg





IVP Administration


 Document     02/10/18 12:45  ANTONELLA  (Rec: 02/10/18 12:45  ANTONELLA  [id number])


     Charges for Administration


      # of IVP Administrations                   1





Ondansetron HCl (Zofran Inj)  4 mg IVP STAT STA


   Stop: 02/10/18 09:40


   Last Admin: 02/10/18 09:47  Dose: 4 mg





IVP Administration


 Document     02/10/18 09:47  ANTONELLA  (Rec: 02/10/18 10:00  ANTONELLA  [facility][id number])


     Charges for Administration


      # of IVP Administrations                   1





Promethazine HCl (Phenergan Inj)  25 mg IM STAT STA


   Stop: 02/10/18 12:29


   Last Admin: 02/10/18 12:45  Dose: 25 mg





IM Administration Charges


 Document     02/10/18 12:45  ANTONELLA  (Rec: 02/10/18 12:45  ANTONELLA  [facility] [id number])


     Injection Site


      MAR Injection Site                         Left Deltoid


     Charges for Administration


      # of IM Administrations                    1














Disposition/Present on Arrival





- Present on Arrival


Any Indicators Present on Arrival: No


History of DVT/PE: No


History of Uncontrolled Diabetes: No


Urinary Catheter: No


History of Decub. Ulcer: No


History Surgical Site Infection Following: None





- Disposition


Have Diagnosis and Disposition been Completed?: Yes


Diagnosis: 


 Gastroparesis, Abdominal pain in female





Disposition: HOME/ ROUTINE


Disposition Time: 12:35


Patient Plan: Discharge


Condition: STABLE


Discharge Instructions (ExitCare):  Abdominal Pain (ED)


Additional Instructions: 


Follow up with your Doctor


Return to ED for any new or worsening symptoms


Referrals: 


Salazar Gonzales MD [Primary Care Provider] - Follow up with primary


Forms:  [employer] (English)

## 2018-02-10 NOTE — RAD
HISTORY:

vomiting r/o obstruction  



COMPARISON:

No prior.



FINDINGS:



BOWEL:

Normal. No obstruction. No free air. 



BONES:

Normal.



OTHER FINDINGS:

None.



IMPRESSION:

No significant or acute  findings to account for/ related to the 

clinical presentation.

## 2018-02-27 ENCOUNTER — HOSPITAL ENCOUNTER (EMERGENCY)
Dept: HOSPITAL 14 - H.ER | Age: 28
Discharge: HOME | End: 2018-02-27
Payer: COMMERCIAL

## 2018-02-27 VITALS
HEART RATE: 78 BPM | SYSTOLIC BLOOD PRESSURE: 127 MMHG | DIASTOLIC BLOOD PRESSURE: 78 MMHG | TEMPERATURE: 97 F | RESPIRATION RATE: 20 BRPM

## 2018-02-27 VITALS — BODY MASS INDEX: 23.8 KG/M2

## 2018-02-27 VITALS — OXYGEN SATURATION: 98 %

## 2018-02-27 DIAGNOSIS — R10.9: Primary | ICD-10-CM

## 2018-02-27 DIAGNOSIS — Z86.59: ICD-10-CM

## 2018-02-27 DIAGNOSIS — E11.9: ICD-10-CM

## 2018-02-27 DIAGNOSIS — Z79.84: ICD-10-CM

## 2018-02-27 DIAGNOSIS — I10: ICD-10-CM

## 2018-02-27 DIAGNOSIS — M81.0: ICD-10-CM

## 2018-02-27 LAB
ALBUMIN SERPL-MCNC: 5.3 G/DL (ref 3.5–5)
ALBUMIN/GLOB SERPL: 1.3 {RATIO} (ref 1–2.1)
ALT SERPL-CCNC: 60 U/L (ref 9–52)
ANISOCYTOSIS BLD QL SMEAR: SLIGHT
AST SERPL-CCNC: 34 U/L (ref 14–36)
BACTERIA #/AREA URNS HPF: (no result) /[HPF]
BASOPHILS # BLD AUTO: 0.1 K/UL (ref 0–0.2)
BASOPHILS NFR BLD: 0.5 % (ref 0–2)
BILIRUB UR-MCNC: NEGATIVE MG/DL
BUN SERPL-MCNC: 19 MG/DL (ref 7–17)
CALCIUM SERPL-MCNC: 10.2 MG/DL (ref 8.4–10.2)
COLOR UR: YELLOW
EOSINOPHIL # BLD AUTO: 0 K/UL (ref 0–0.7)
EOSINOPHIL NFR BLD: 0.1 % (ref 0–4)
ERYTHROCYTE [DISTWIDTH] IN BLOOD BY AUTOMATED COUNT: 14.3 % (ref 11.5–14.5)
GFR NON-AFRICAN AMERICAN: > 60
GLUCOSE UR STRIP-MCNC: 50 MG/DL
HGB BLD-MCNC: 13.9 G/DL (ref 12–16)
LEUKOCYTE ESTERASE UR-ACNC: (no result) LEU/UL
LIPASE SERPL-CCNC: 41 U/L (ref 23–300)
LYMPHOCYTE: 4 % (ref 20–50)
LYMPHOCYTES # BLD AUTO: 0.7 K/UL (ref 1–4.3)
LYMPHOCYTES NFR BLD AUTO: 5.5 % (ref 20–40)
MCH RBC QN AUTO: 27.8 PG (ref 27–31)
MCHC RBC AUTO-ENTMCNC: 33.1 G/DL (ref 33–37)
MCV RBC AUTO: 84 FL (ref 81–99)
MONOCYTE: 3 % (ref 0–10)
MONOCYTES # BLD: 0.4 K/UL (ref 0–0.8)
MONOCYTES NFR BLD: 2.9 % (ref 0–10)
NEUTROPHILS # BLD: 12.1 K/UL (ref 1.8–7)
NEUTROPHILS NFR BLD AUTO: 91 % (ref 50–75)
NEUTROPHILS NFR BLD AUTO: 93 % (ref 42–75)
NRBC BLD AUTO-RTO: 0.1 % (ref 0–0)
OVALOCYTES BLD QL SMEAR: SLIGHT
PH UR STRIP: 5 [PH] (ref 5–8)
PLATELET # BLD EST: NORMAL 10*3/UL
PLATELET # BLD: 331 K/UL (ref 130–400)
PMV BLD AUTO: 7.7 FL (ref 7.2–11.7)
POIKILOCYTOSIS BLD QL SMEAR: SLIGHT
PROT UR STRIP-MCNC: >=500 MG/DL
RBC # BLD AUTO: 5 MIL/UL (ref 3.8–5.2)
RBC # UR STRIP: (no result) /UL
SP GR UR STRIP: 1.02 (ref 1–1.03)
SQUAMOUS EPITHIAL: 1 /HPF (ref 0–5)
TOTAL CELLS COUNTED BLD: 100
URINE CLARITY: (no result)
URINE NITRATE: NEGATIVE
UROBILINOGEN UR-MCNC: (no result) MG/DL (ref 0.2–1)
WBC # BLD AUTO: 13.3 K/UL (ref 4.8–10.8)

## 2018-02-27 PROCEDURE — 85025 COMPLETE CBC W/AUTO DIFF WBC: CPT

## 2018-02-27 PROCEDURE — 80324 DRUG SCREEN AMPHETAMINES 1/2: CPT

## 2018-02-27 PROCEDURE — 80053 COMPREHEN METABOLIC PANEL: CPT

## 2018-02-27 PROCEDURE — 96375 TX/PRO/DX INJ NEW DRUG ADDON: CPT

## 2018-02-27 PROCEDURE — 81003 URINALYSIS AUTO W/O SCOPE: CPT

## 2018-02-27 PROCEDURE — 96374 THER/PROPH/DIAG INJ IV PUSH: CPT

## 2018-02-27 PROCEDURE — 80353 DRUG SCREENING COCAINE: CPT

## 2018-02-27 PROCEDURE — 80345 DRUG SCREENING BARBITURATES: CPT

## 2018-02-27 PROCEDURE — 83992 ASSAY FOR PHENCYCLIDINE: CPT

## 2018-02-27 PROCEDURE — 80349 CANNABINOIDS NATURAL: CPT

## 2018-02-27 PROCEDURE — 80346 BENZODIAZEPINES1-12: CPT

## 2018-02-27 PROCEDURE — 80361 OPIATES 1 OR MORE: CPT

## 2018-02-27 PROCEDURE — 83690 ASSAY OF LIPASE: CPT

## 2018-02-27 PROCEDURE — 99283 EMERGENCY DEPT VISIT LOW MDM: CPT

## 2018-02-27 PROCEDURE — 80358 DRUG SCREENING METHADONE: CPT

## 2018-02-27 PROCEDURE — 82948 REAGENT STRIP/BLOOD GLUCOSE: CPT

## 2018-02-27 PROCEDURE — 81025 URINE PREGNANCY TEST: CPT

## 2018-02-27 NOTE — ED PDOC
HPI: Abdomen


Chief Complaint (Provider): abdominal pain


History Per: Patient


History/Exam Limitations: no limitations


Onset/Duration Of Symptoms: Days (2)


Current Symptoms Are (Timing): Still Present


Location Of Pain/Discomfort: Diffuse


Quality Of Discomfort: "Pain"


Associated Symptoms: Nausea, Vomiting, Diarrhea


Additional History Per: Patient





<Emma North - Last Filed: 02/27/18 06:01>





<Sourav Gaffney - Last Filed: 02/27/18 08:17>


Time Seen by Provider: 02/27/18 04:16


Chief Complaint (Nursing): Abdominal Pain


Additional Complaint(s): 





28 y/o female history of diabetes, gastroparesis presents with diffuse 

abdominal pain x 2 days.  Associated nausea, vomiting, and diarrhea.  Patient 

evaluated by her PMD yesterday and prescribed Zofran, but states it is not 

helping.  Patient last saw her GI doctor last week.  Denies fever, chest pain, 

shortness of breath, palpitations, urinary symptoms, recent travel, sick 

contacts.  


Patient requesting IV benadryl to go with whatever pain medication she is 

getting. (Emma North)





Supervising Attending Note





<Emma North - Last Filed: 02/27/18 06:01>





- Attestation:


I have personally seen and examined this patient.: Yes


I have fully participated in the care of the patient.: Yes


I have reviewed all pertinent clinical information: Yes





<Sourav Gaffney - Last Filed: 02/27/18 08:17>





- Notes:


Notes:: 


0700


Pt. has leukocytosis, likely from vomiting.  Pt. requesting "1 unit" of 

narcotics, however patient was reminded of chronic pain/opiate policy.  Chart 

review demonstrates that patient responded well to amitryptiline.  Will give 

one dose and reassess.  Signed out to Dr. rodriguez. (Sourav Gaffney)





Past Medical History


Reviewed: Historical Data, Nursing Documentation, Vital Signs





- Medical History


PMH: Anxiety, Depression, Diabetes, Gastritis, HTN, Osteoporosis (pt unsure of 

this hx), Sickle Cell Disease ("JUST FOUND OUT A MONTH AGO"), Chronic Pain


   Denies: Crohn's Disease, Diverticulitis, Gall Bladder Disease, HIV, 

Pancreatitis, Chronic Kidney Disease





- Surgical History


Surgical History: Endoscopy





- Family History


Family History: States: Unknown Family Hx, Diabetes





- Immunization History


Hx Tetanus Toxoid Vaccination: Yes


Hx Influenza Vaccination: Yes


Hx Pneumococcal Vaccination: No





<Emma North - Last Filed: 02/27/18 06:01>





<Sourav Gaffney - Last Filed: 02/27/18 08:17>


Vital Signs: 





 Last Vital Signs











Temp  97.8 F   02/27/18 04:17


 


Pulse  76   02/27/18 08:15


 


Resp  18   02/27/18 08:15


 


BP  140/80   02/27/18 08:15


 


Pulse Ox  98   02/27/18 08:15














- Home Medications


Home Medications: 


 Ambulatory Orders











 Medication  Instructions  Recorded


 


metFORMIN [glucOPHAGE] 500 mg PO BID 10/19/17














- Allergies


Allergies/Adverse Reactions: 


 Allergies











Allergy/AdvReac Type Severity Reaction Status Date / Time


 


No Known Allergies Allergy   Verified 02/27/18 04:19














Review of Systems


ROS Statement: Except As Marked, All Systems Reviewed And Found Negative


Gastrointestinal: Positive for: Nausea, Vomiting, Abdominal Pain, Diarrhea





<Emma North - Last Filed: 02/27/18 06:01>





Physical Exam





- Reviewed


Nursing Documentation Reviewed: Yes


Vital Signs Reviewed: Yes





- Physical Exam


Appears: Positive for: Well, Non-toxic, No Acute Distress


Head Exam: Positive for: ATRAUMATIC, NORMAL INSPECTION, NORMOCEPHALIC


Skin: Positive for: Normal Color


Eye Exam: Positive for: Normal appearance


ENT: Positive for: Normal ENT Inspection


Cardiovascular/Chest: Positive for: Regular Rate, Rhythm


Respiratory: Positive for: Normal Breath Sounds


Gastrointestinal/Abdominal: Positive for: Normal Exam, Bowel Sounds, Soft, 

Tenderness (diffuse)


Back: Positive for: Normal Inspection


Extremity: Positive for: Normal ROM


Neurologic/Psych: Positive for: Alert, Oriented





<Emma North - Last Filed: 02/27/18 06:01>





- Laboratory Results


Result Diagrams: 


 02/27/18 05:40





 02/27/18 05:40





- ECG


O2 Sat by Pulse Oximetry: 97





<Emma North - Last Filed: 02/27/18 06:01>





- Laboratory Results


Result Diagrams: 


 02/27/18 05:40





 02/27/18 05:40





<Sourav Gaffney - Last Filed: 02/27/18 08:17>





- Progress


ED Course And Treament: 





Patient walked behind nursing station to notify writer that she needs 

medication for pain.


Chart reviewed, patient with multiple ED visits/admission to [facility] 

for same; history of drug-seeking behavior.





Patient was educated on our narcotic policy and advised that she will be 

treated with nonnarcotics for chronic pain. 





 (Emma North)





Disposition





- Disposition


Disposition Time: 06:00


Patient Signed Over To: Sourav Gaffney


Handoff Comments: labs, re-eval





<Emma North - Last Filed: 02/27/18 06:01>





- Patient ED Disposition


Is Patient to be Admitted: Transfer of Care





- Disposition


Disposition: Transfer of Care


Disposition Time: 07:00


Patient Signed Over To: Tunde Rodriguez


Handoff Comments: re-ash s/p meds, po challenge





<Sourav Gaffney - Last Filed: 02/27/18 08:17>





- Clinical Impression


Clinical Impression: 


 Abdominal pain








- Disposition


Condition: STABLE

## 2018-02-27 NOTE — ED PDOC
- Laboratory Results


Result Diagrams: 


 02/27/18 05:40





 02/27/18 05:40





- ECG


O2 Sat by Pulse Oximetry: 98





Medical Decision Making


Medical Decision Making: 


Time: 07:00


Patient signed out to me by Dr. Gaffney pending PO trial.


pt tolerated po in ED


stable for dc.








--------------------------------------------------------------------------------

-----------------


Scribe Attestation:


Documented by Jeffry Peraza, acting as a scribe for Tunde Rodriguez MD.





Provider Scribe Attestation:


All medical record entries made by the Scribe were at my direction and 

personally dictated by me. I have reviewed the chart and agree that the record 

accurately reflects my personal performance of the history, physical exam, 

medical decision making, and the department course for this patient. I have 

also personally directed, reviewed, and agree with the discharge instructions 

and disposition.








Disposition





- Clinical Impression


Clinical Impression: 


 Abdominal pain








- POA


Present On Arrival: None





- Disposition


Referrals: 


[facility] [Outside]


formerly [facility] [Outside]


Disposition: Routine/Home


Disposition Time: 09:00


Condition: IMPROVED


Additional Instructions: 


follow up with your primary doctor in 1-2 days


return to the ED with any worsening or concerning symptoms.


Instructions:  Chronic Pain (DC)


Forms:  [facility] (English)

## 2018-02-28 ENCOUNTER — HOSPITAL ENCOUNTER (EMERGENCY)
Dept: HOSPITAL 42 - ED | Age: 28
Discharge: HOME | End: 2018-02-28
Payer: COMMERCIAL

## 2018-02-28 VITALS
TEMPERATURE: 98 F | SYSTOLIC BLOOD PRESSURE: 124 MMHG | DIASTOLIC BLOOD PRESSURE: 78 MMHG | OXYGEN SATURATION: 98 % | HEART RATE: 66 BPM

## 2018-02-28 VITALS — BODY MASS INDEX: 23.8 KG/M2

## 2018-02-28 VITALS — RESPIRATION RATE: 18 BRPM

## 2018-02-28 DIAGNOSIS — R10.9: ICD-10-CM

## 2018-02-28 DIAGNOSIS — K31.84: ICD-10-CM

## 2018-02-28 DIAGNOSIS — E11.43: Primary | ICD-10-CM

## 2018-02-28 DIAGNOSIS — D57.1: ICD-10-CM

## 2018-02-28 DIAGNOSIS — F17.210: ICD-10-CM

## 2018-02-28 DIAGNOSIS — I10: ICD-10-CM

## 2018-02-28 LAB
ALBUMIN SERPL-MCNC: 5 G/DL (ref 3–4.8)
ALBUMIN/GLOB SERPL: 1.3 {RATIO} (ref 1.1–1.8)
ALT SERPL-CCNC: 52 U/L (ref 7–56)
APPEARANCE UR: (no result)
AST SERPL-CCNC: 43 U/L (ref 14–36)
BACTERIA #/AREA URNS HPF: (no result) /[HPF]
BILIRUB UR-MCNC: NEGATIVE MG/DL
BUN SERPL-MCNC: 18 MG/DL (ref 7–21)
CALCIUM SERPL-MCNC: 9.8 MG/DL (ref 8.4–10.5)
COLOR UR: YELLOW
EPI CELLS #/AREA URNS HPF: (no result) /HPF (ref 0–5)
ERYTHROCYTE [DISTWIDTH] IN BLOOD BY AUTOMATED COUNT: 13.7 % (ref 11.5–14.5)
GFR NON-AFRICAN AMERICAN: > 60
GLUCOSE UR STRIP-MCNC: NEGATIVE MG/DL
HCG,QUALITATIVE URINE: NEGATIVE
HGB BLD-MCNC: 13.4 G/DL (ref 12–16)
LEUKOCYTE ESTERASE UR-ACNC: NEGATIVE LEU/UL
LIPASE SERPL-CCNC: 89 U/L (ref 23–300)
MCH RBC QN AUTO: 27.7 PG (ref 25–35)
MCHC RBC AUTO-ENTMCNC: 34.1 G/DL (ref 31–37)
MCV RBC AUTO: 81.4 FL (ref 80–105)
PH UR STRIP: 6 [PH] (ref 4.7–8)
PLATELET # BLD: 298 10^3/UL (ref 120–450)
PMV BLD AUTO: 9.7 FL (ref 7–11)
PROT UR STRIP-MCNC: 100 MG/DL
RBC # BLD AUTO: 4.83 10^6/UL (ref 3.5–6.1)
RBC # UR STRIP: (no result) /UL
RBC #/AREA URNS HPF: (no result) /HPF (ref 0–2)
SP GR UR STRIP: 1.02 (ref 1–1.03)
URINE NITRATE: NEGATIVE
UROBILINOGEN UR STRIP-ACNC: 1 E.U./DL
WBC # BLD AUTO: 9.6 10^3/UL (ref 4.5–11)
WBC #/AREA URNS HPF: NEGATIVE /HPF (ref 0–6)

## 2018-02-28 PROCEDURE — 99284 EMERGENCY DEPT VISIT MOD MDM: CPT

## 2018-02-28 PROCEDURE — 96361 HYDRATE IV INFUSION ADD-ON: CPT

## 2018-02-28 PROCEDURE — 96375 TX/PRO/DX INJ NEW DRUG ADDON: CPT

## 2018-02-28 PROCEDURE — 80053 COMPREHEN METABOLIC PANEL: CPT

## 2018-02-28 PROCEDURE — 96374 THER/PROPH/DIAG INJ IV PUSH: CPT

## 2018-02-28 PROCEDURE — 84703 CHORIONIC GONADOTROPIN ASSAY: CPT

## 2018-02-28 PROCEDURE — 81001 URINALYSIS AUTO W/SCOPE: CPT

## 2018-02-28 PROCEDURE — 71045 X-RAY EXAM CHEST 1 VIEW: CPT

## 2018-02-28 PROCEDURE — 83690 ASSAY OF LIPASE: CPT

## 2018-02-28 PROCEDURE — 85027 COMPLETE CBC AUTOMATED: CPT

## 2018-02-28 NOTE — RAD
HISTORY:

abdominal pain  



COMPARISON:

01/11/2018 



FINDINGS:



LUNGS:

No active pulmonary disease.



PLEURA:

No significant pleural effusion identified, no pneumothorax apparent.



CARDIOVASCULAR:

Normal.



OSSEOUS STRUCTURES:

No significant abnormalities.



VISUALIZED UPPER ABDOMEN:

Normal.



OTHER FINDINGS:

None.



IMPRESSION:

No active disease.

## 2018-02-28 NOTE — ED PDOC
Physical Exam


Vital Signs Reviewed: Yes


Vital Signs











  Temp Pulse Resp BP Pulse Ox


 


 02/28/18 07:40  98 F  66  18  124/78  98


 


 02/28/18 05:34  98.3 F  96 H  18  147/101 H  100











Temperature: Afebrile


Blood Pressure: Normal


Pulse: Regular


Respiratory Rate: Normal


Appearance: Positive for: Well-Appearing


Pain Distress: None


Mental Status: Positive for: Alert and Oriented X 3





Medical Decision Making


ED Course and Treatment: 


02/28/18 07:16


Patient endorsed to me by Dr. Ruff, as patient is currently awaiting lab 

results. Chest X-ray shows no acute distress.





02/28/18 08:31


Patient was given Maaloz, Viscous Lidocaine and Donnatol for further relief of 

symptoms. She felt better. Abdomen is soft, NT, ND, BSx4. She is tolerating PO 

fluids. I discussed in detail with patient the treatment for her abdominal 

pain. She has been dealing with reflux for a long time and she says she has a 

GI doctor. I advised to her to make sure to follow the diet recommended by her 

specialist. I advised the importance of keeping well hydration. She was 

informed that she should return to the ED if symptoms worsen or any other 

concern. 





- Lab Interpretations


Lab Results: 








 02/28/18 07:00 





 02/28/18 07:00 





 Lab Results





02/28/18 07:13: Urine Color Yellow, Urine Appearance Slight-cloudy, Urine pH 6.0

, Ur Specific Gravity 1.025, Urine Protein 100 H, Urine Glucose (UA) Negative, 

Urine Ketones 40 H, Urine Blood Moderate H, Urine Nitrate Negative, Urine 

Bilirubin Negative, Urine Urobilinogen 1.0 H, Ur Leukocyte Esterase Negative, 

Urine RBC 0 - 2, Urine WBC Negative, Ur Epithelial Cells 0 - 2, Urine Bacteria 

None, Urine HCG, Qual Negative


02/28/18 07:00: WBC 9.6  D, RBC 4.83, Hgb 13.4, Hct 39.3, MCV 81.4, MCH 27.7, 

MCHC 34.1, RDW 13.7, Plt Count 298, MPV 9.7


02/28/18 07:00: Sodium 140, Potassium 4.1, Chloride 99, Carbon Dioxide 27, 

Anion Gap 19, BUN 18, Creatinine 0.7, Est GFR (African Amer) > 60, Est GFR (Non-

Af Amer) > 60, Random Glucose 153 H, Calcium 9.8, Total Bilirubin 1.3, AST 43 H 

D, ALT 52, Alkaline Phosphatase 56, Total Protein 8.8 H, Albumin 5.0 H, 

Globulin 3.8, Albumin/Globulin Ratio 1.3, Lipase 89








I have reviewed the lab results: Yes





- RAD Interpretation


Radiology Orders: 








02/28/18 06:04


CHEST PORTABLE [RAD] Stat 














- Medication Orders


Current Medication Orders: 








Sodium Chloride (Sodium Chloride 0.9%)  1,000 mls @ 100 mls/hr IV .Q10H LUIS ANTONIO


   Last Admin: 02/28/18 07:48  Dose: 100 mls/hr





eMAR Start Stop


 Document     02/28/18 07:48  LANDEN  (Rec: 02/28/18 07:47  LANDEN  [id number])


     Intravenous Solution


      Start Date                                 02/28/18


      Start Time                                 07:10








Discontinued Medications





Al Hydrox/Mg Hydrox/Simethicone (Maalox Plus 30 Ml)  30 ml PO STAT STA


   Stop: 02/28/18 07:35


   Last Admin: 02/28/18 07:45  Dose: 30 ml





Belladonna/Phenobarbital (Donnatal Elixir)  5 ml PO STAT STA


   Stop: 02/28/18 07:35


   Last Admin: 02/28/18 07:46  Dose: 5 ml





Diphenhydramine HCl (Benadryl)  50 mg IVP ONCE ONE


   Stop: 02/28/18 06:08


   Last Admin: 02/28/18 06:43  Dose: 50 mg





IVP Administration


 Document     02/28/18 06:43  JENNY  (Rec: 02/28/18 06:43  JENNY [id number])


     Charges for Administration


      # of IVP Administrations                   1





Famotidine (Pepcid)  20 mg IVP STAT STA


   Stop: 02/28/18 06:08


   Last Admin: 02/28/18 06:43  Dose: 20 mg





IVP Administration


 Document     02/28/18 06:43  RG  (Rec: 02/28/18 06:43  JENNY [id number])


     Charges for Administration


      # of IVP Administrations                   1





Sodium Chloride (Sodium Chloride 0.9%)  1,000 mls @ 999 mls/hr IV .Q1H1M STA


   Stop: 02/28/18 07:07


   Last Admin: 02/28/18 06:44  Dose: 999 mls/hr





eMAR Start Stop


 Document     02/28/18 06:44  RG  (Rec: 02/28/18 06:44    [id number])


     Intravenous Solution


      Start Date                                 02/28/18


      Start Time                                 06:44





Ketorolac Tromethamine (Toradol)  30 mg IVP ONCE ONE


   Stop: 02/28/18 06:08


   Last Admin: 02/28/18 06:43  Dose: 30 mg





MAR Pain Assessment


 Document     02/28/18 06:43  RG  (Rec: 02/28/18 06:44  JENNY  [id number])


     Pain Reassessment


      Is this a pain reassessment?               Yes


     Sleep


      Is patient sleeping during reassessment?   No


     Location


      Pain Location Body Site                    Abdomen


     Description


      Description                                Constant


      Pain Behavior                              Irritability


      Aggravating Factors                        Contant


IVP Administration


 Document     02/28/18 06:43  RG  (Rec: 02/28/18 06:44    [id number])


     Charges for Administration


      # of IVP Administrations                   1





Lidocaine HCl (Lidocaine 2% Viscous)  15 ml MM STAT STA


   Stop: 02/28/18 07:37


   Last Admin: 02/28/18 07:45  Dose: 15 ml





Metoclopramide HCl (Reglan)  10 mg IVP ONCE ONE


   Stop: 02/28/18 06:08


   Last Admin: 02/28/18 06:43  Dose: 10 mg





IVP Administration


 Document     02/28/18 06:43  RG  (Rec: 02/28/18 06:43    [id number])


     Charges for Administration


      # of IVP Administrations                   1














- Scribe Statement


The provider has reviewed the documentation as recorded by the Sharda Oden





Provider Scribe Attestation:


All medical record entries made by the Kristalibpilar were at my direction and 

personally dictated by me. I have reviewed the chart and agree that the record 

accurately reflects my personal performance of the history, physical exam, 

medical decision making, and the department course for this patient. I have 

also personally directed, reviewed, and agree with the discharge instructions 

and disposition.








Disposition/Present on Arrival





- Present on Arrival


Any Indicators Present on Arrival: No


History of DVT/PE: No


History of Uncontrolled Diabetes: No


Urinary Catheter: No


History of Decub. Ulcer: No


History Surgical Site Infection Following: None





- Disposition


Have Diagnosis and Disposition been Completed?: Yes


Diagnosis: 


 Gastroparesis, Abdominal pain





Disposition: HOME/ ROUTINE


Disposition Time: 08:15


Patient Plan: Discharge


Patient Problems: 


 Current Active Problems











Problem Status Onset


 


Gastroparesis Chronic  


 


Abdominal pain Acute  











Condition: IMPROVED


Discharge Instructions (ExitCare):  Gastroparesis (Delayed Gastric Emptying), 

Nausea and Vomiting, Adult


Additional Instructions: 





Ms Potts, thank you for letting us take care of you today. Your provider was 

Dr. Gregg. You were treated for Abdominal Pain. The emergency medical care 

you received today was directed at your acute symptoms. If you were prescribed 

any medication, please fill it and take as directed. It may take several days 

for your symptoms to resolve. Return to the Emergency Department if your 

symptoms worsen, do not improve, or if you have any other problems.





Please contact your doctor or call one of the physicians/clinics you have been 

referred to that are listed on the Patient Visit Information form that is 

included in your discharge packet. Bring any paperwork you were given at 

discharge with you along with any medications you are taking to your follow up 

visit. Our treatment cannot replace ongoing medical care by a primary care 

provider (PCP) outside of the emergency department.





Thank you for allowing the [facility] team to be part of your care today.








If you had an X-Ray or CT scan: A Radiologist will review the ED reading if any 

change in treatment is needed we will contact you.***





If you had a blood, urine, or wound culture: It will take several days for the 

results, if any change in treatment is needed we will contact you.***





If you had an STI test: It will take 48 hours for the results. Please call 

after 1 week if you have not heard back.***


Prescriptions: 


Ondansetron ODT [Zofran ODT] 4 mg PO Q6 #14 odt


Pantoprazole Sodium [Protonix] 40 mg PO DAILY #30 ect


Referrals: 


Salazar Gonzales MD [Primary Care Provider] - Follow up with primary


Forms:  [facility] [employer] (English)

## 2018-02-28 NOTE — ED PDOC
Arrival/HPI





- General


Chief Complaint: Medical Clearance


Time Seen by Provider: 02/28/18 05:38


Historian: Patient





- History of Present Illness


Narrative History of Present Illness (Text): 


02/28/18 06:03


Arnoldo Arthur is a 27 year old female, whose past medical history includes 

hypertension, GERD, diabetes, gastroparesis, and sickle cell trait, who 

presents to the Emergency department complaining of generalized abdominal pain 

for the past few days. Patient reports associated nausea and recurrent 

vomiting. Patient was seen yesterday at [facility] for 

similar complaints, treated, and discharged home. Patient denies any fever, 

chills, chest pain, shortness of breath, diarrhea, urinary symptoms, back pain, 

neck pain, headache, dizziness, or any other complaints.


Symptom Onset: Gradual


Symptom Course: Unchanged


Activities at Onset: Light


Context: Home





Past Medical History





- Provider Review


Nursing Documentation Reviewed: Yes





- Infectious Disease


Hx of Infectious Diseases: None





- Tetanus Immunization


Tetanus Immunization: Unknown





- Cardiac


Hx Hypertension: Yes





- Pulmonary


Hx Respiratory Disorders: No





- Neurological


Hx Neurological Disorder: No





- HEENT


Hx HEENT Disorder: No





- Renal


Hx Renal Disorder: No





- Endocrine/Metabolic


Hx Diabetes Mellitus Type 2: Yes





- Hematological/Oncological


Hx Sickle Cell Disease: Yes ("JUST FOUND OUT A MONTH AGO")





- Integumentary


Hx Dermatological Disorder: No





- Musculoskeletal/Rheumatological


Hx Osteoporosis: Yes (pt unsure of this hx)





- Gastrointestinal


Hx Crohn's Disease: No


Hx Diverticulitis: No


Hx Gall Bladder Disease: No


Hx Gastritis: Yes


Hx Pancreatitis: No


Other/Comment: gastroparesis





- Genitourinary/Gynecological


Hx Genitourinary Disorders: No





- Psychiatric


Hx Anxiety: Yes


Hx Depression: Yes


Hx Substance Use: Yes





- Past Surgical History


Past Surgical History: Non-Contributing





- Anesthesia


Hx Anesthesia: Yes


Hx Anesthesia Reactions: No


Hx Malignant Hyperthermia: No





- Suicidal Assessment


Feels Threatened In Home Enviroment: No





Family/Social History





- Physician Review


Nursing Documentation Reviewed: Yes


Family/Social History: Unknown Family HX


Smoking Status: Heavy Smoker > 10 Cigarettes Daily


Hx Alcohol Use: No


Hx Substance Use: Yes


Substance used: weed


Hx Substance Use Treatment: No





Allergies/Home Meds


Allergies/Adverse Reactions: 


Allergies





No Known Allergies Allergy (Verified 02/28/18 05:39)


 








Home Medications: 


 Home Meds











 Medication  Instructions  Recorded  Confirmed


 


metFORMIN [glucOPHAGE] 500 mg PO BID 10/19/17 02/10/18














Review of Systems





- Physician Review


All systems were reviewed & negative as marked: Yes





- Review of Systems


Constitutional: Normal.  absent: Fevers


Eyes: Normal


ENT: Normal


Respiratory: Normal.  absent: SOB, Cough


Cardiovascular: Normal.  absent: Chest Pain


Gastrointestinal: Abdominal Pain, Nausea, Vomiting.  absent: Diarrhea


Genitourinary Female: Normal.  absent: Dysuria, Frequency, Hematuria, Urine 

Output Changes


Musculoskeletal: Normal.  absent: Back Pain, Neck Pain


Skin: Normal.  absent: Rash


Neurological: Normal.  absent: Headache, Dizziness


Endocrine: Normal


Hemo/Lymphatic: Normal


Psychiatric: Normal





Physical Exam


Vital Signs Reviewed: Yes


Vital Signs











  Temp Pulse Resp BP Pulse Ox


 


 02/28/18 05:34  98.3 F  96 H  18  147/101 H  100











Temperature: Afebrile


Blood Pressure: Normal


Pulse: Regular


Respiratory Rate: Normal


Appearance: Positive for: Well-Appearing, Non-Toxic, Comfortable


Pain Distress: None


Mental Status: Positive for: Alert and Oriented X 3





- Systems Exam


Head: Present: Atraumatic, Normocephalic


Pupils: Present: PERRL


Extroacular Muscles: Present: EOMI


Conjunctiva: Present: Normal


Mouth: Present: Moist Mucous Membranes


Neck: Present: Normal Range of Motion


Respiratory/Chest: Present: Clear to Auscultation, Good Air Exchange.  No: 

Respiratory Distress, Accessory Muscle Use


Cardiovascular: Present: Regular Rate and Rhythm, Normal S1, S2.  No: Murmurs


Abdomen: Present: Tenderness (mild diffuse abdominal tenderness), Normal Bowel 

Sounds.  No: Distention, Peritoneal Signs


Back: Present: Normal Inspection


Upper Extremity: Present: Normal Inspection.  No: Cyanosis, Edema


Lower Extremity: Present: Normal Inspection.  No: Edema


Neurological: Present: GCS=15, CN II-XII Intact, Speech Normal


Skin: Present: Warm, Dry, Normal Color.  No: Rashes


Psychiatric: Present: Alert, Oriented x 3, Normal Insight, Normal Concentration





Medical Decision Making


ED Course and Treatment: 


02/28/18 06:03


Impression:


27 year old female complaining of generalized abdominal pain, nausea, and 

vomiting.





Plan:


-- Labs, lipase


-- Urinalysis


-- Chest X-ray


-- Reassess and disposition





Prior Visits:


Notes and results from previous visits were reviewed. 


Pt was seen at [facility] ER yesterday for similar complaint. Treated with non-

narcotic medication and d/c home.





Progress Notes:


02/28/18 07:00


Case discussed with Dr. Gregg, pending labs, response to treatment, re-

assessment, and final disposition.





- RAD Interpretation


Radiology Orders: 








02/28/18 06:04


CHEST PORTABLE [RAD] Stat 














- Medication Orders


Current Medication Orders: 








Sodium Chloride (Sodium Chloride 0.9%)  1,000 mls @ 999 mls/hr IV .Q1H1M STA


   Stop: 02/28/18 07:07


   Last Admin: 02/28/18 06:44  Dose: 999 mls/hr





eMAR Start Stop


 Document     02/28/18 06:44  RG  (Rec: 02/28/18 06:44  JENNY [id number])


     Intravenous Solution


      Start Date                                 02/28/18


      Start Time                                 06:44





Sodium Chloride (Sodium Chloride 0.9%)  1,000 mls @ 100 mls/hr IV .Q10H LUIS ANTONIO





Discontinued Medications





Diphenhydramine HCl (Benadryl)  50 mg IVP ONCE ONE


   Stop: 02/28/18 06:08


   Last Admin: 02/28/18 06:43  Dose: 50 mg





IVP Administration


 Document     02/28/18 06:43  RG  (Rec: 02/28/18 06:43  RG  [id number])


     Charges for Administration


      # of IVP Administrations                   1





Famotidine (Pepcid)  20 mg IVP STAT STA


   Stop: 02/28/18 06:08


   Last Admin: 02/28/18 06:43  Dose: 20 mg





IVP Administration


 Document     02/28/18 06:43  RG  (Rec: 02/28/18 06:43  JENNY [id number])


     Charges for Administration


      # of IVP Administrations                   1





Ketorolac Tromethamine (Toradol)  30 mg IVP ONCE ONE


   Stop: 02/28/18 06:08


   Last Admin: 02/28/18 06:43  Dose: 30 mg





MAR Pain Assessment


 Document     02/28/18 06:43  RG  (Rec: 02/28/18 06:44  JENNY [id number])


     Pain Reassessment


      Is this a pain reassessment?               Yes


     Sleep


      Is patient sleeping during reassessment?   No


     Location


      Pain Location Body Site                    Abdomen


     Description


      Description                                Constant


      Pain Behavior                              Irritability


      Aggravating Factors                        Contant


IVP Administration


 Document     02/28/18 06:43  RG  (Rec: 02/28/18 06:44  JENNY  [id number])


     Charges for Administration


      # of IVP Administrations                   1





Metoclopramide HCl (Reglan)  10 mg IVP ONCE ONE


   Stop: 02/28/18 06:08


   Last Admin: 02/28/18 06:43  Dose: 10 mg





IVP Administration


 Document     02/28/18 06:43  JENNY  (Rec: 02/28/18 06:43  JENNY  [id number])


     Charges for Administration


      # of IVP Administrations                   1














- Scribe Statement


The provider has reviewed the documentation as recorded by the Scribpilar Cai





All medical record entries made by the Kristalibpilar were at my direction and 

personally dictated by me. I have reviewed the chart and agree that the record 

accurately reflects my personal performance of the history, physical exam, 

medical decision making, and the department course for this patient. I have 

also personally directed, reviewed, and agree with the discharge instructions 

and disposition.





Disposition/Present on Arrival





- Present on Arrival


Any Indicators Present on Arrival: No


History of DVT/PE: No


History of Uncontrolled Diabetes: No


Urinary Catheter: No


History of Decub. Ulcer: No


History Surgical Site Infection Following: None





- Disposition


Have Diagnosis and Disposition been Completed?: No


Diagnosis: 


 Gastroparesis, Abdominal pain





Disposition Time: 07:00


Condition: STABLE


Referrals: 


Salazar Gonzales MD [Primary Care Provider] - Follow up with primary


Forms:  [employer] (English)

## 2018-04-13 ENCOUNTER — HOSPITAL ENCOUNTER (EMERGENCY)
Dept: HOSPITAL 31 - C.ER | Age: 28
Discharge: HOME | End: 2018-04-13
Payer: COMMERCIAL

## 2018-04-13 VITALS — BODY MASS INDEX: 23.8 KG/M2

## 2018-04-13 VITALS — SYSTOLIC BLOOD PRESSURE: 120 MMHG | DIASTOLIC BLOOD PRESSURE: 77 MMHG | TEMPERATURE: 98.4 F | HEART RATE: 82 BPM

## 2018-04-13 VITALS — RESPIRATION RATE: 18 BRPM

## 2018-04-13 DIAGNOSIS — K29.70: ICD-10-CM

## 2018-04-13 DIAGNOSIS — G43.909: Primary | ICD-10-CM

## 2018-04-13 LAB
ALBUMIN SERPL-MCNC: 4.6 G/DL (ref 3.5–5)
ALBUMIN/GLOB SERPL: 1.2 {RATIO} (ref 1–2.1)
ALT SERPL-CCNC: 21 U/L (ref 9–52)
AST SERPL-CCNC: 42 U/L (ref 14–36)
BASOPHILS # BLD AUTO: 0.1 K/UL (ref 0–0.2)
BASOPHILS NFR BLD: 0.8 % (ref 0–2)
BUN SERPL-MCNC: 11 MG/DL (ref 7–17)
CALCIUM SERPL-MCNC: 9.8 MG/DL (ref 8.6–10.4)
EOSINOPHIL # BLD AUTO: 0 K/UL (ref 0–0.7)
EOSINOPHIL NFR BLD: 0.2 % (ref 0–4)
ERYTHROCYTE [DISTWIDTH] IN BLOOD BY AUTOMATED COUNT: 13.6 % (ref 11.5–14.5)
GFR NON-AFRICAN AMERICAN: > 60
HGB BLD-MCNC: 12.8 G/DL (ref 11–16)
LIPASE: 174 U/L (ref 23–300)
LYMPHOCYTES # BLD AUTO: 1.6 K/UL (ref 1–4.3)
LYMPHOCYTES NFR BLD AUTO: 19.2 % (ref 20–40)
MCH RBC QN AUTO: 27.6 PG (ref 27–31)
MCHC RBC AUTO-ENTMCNC: 33.5 G/DL (ref 33–37)
MCV RBC AUTO: 82.3 FL (ref 81–99)
MONOCYTES # BLD: 0.3 K/UL (ref 0–0.8)
MONOCYTES NFR BLD: 3.8 % (ref 0–10)
NEUTROPHILS # BLD: 6.2 K/UL (ref 1.8–7)
NEUTROPHILS NFR BLD AUTO: 76 % (ref 50–75)
NRBC BLD AUTO-RTO: 0 % (ref 0–2)
PLATELET # BLD: 336 K/UL (ref 130–400)
PMV BLD AUTO: 8 FL (ref 7.2–11.7)
RBC # BLD AUTO: 4.64 MIL/UL (ref 3.8–5.2)
WBC # BLD AUTO: 8.2 K/UL (ref 4.8–10.8)

## 2018-04-13 PROCEDURE — 99285 EMERGENCY DEPT VISIT HI MDM: CPT

## 2018-04-13 PROCEDURE — 85025 COMPLETE CBC W/AUTO DIFF WBC: CPT

## 2018-04-13 PROCEDURE — 83690 ASSAY OF LIPASE: CPT

## 2018-04-13 PROCEDURE — 96374 THER/PROPH/DIAG INJ IV PUSH: CPT

## 2018-04-13 PROCEDURE — 96375 TX/PRO/DX INJ NEW DRUG ADDON: CPT

## 2018-04-13 PROCEDURE — 80053 COMPREHEN METABOLIC PANEL: CPT

## 2018-04-13 PROCEDURE — 96361 HYDRATE IV INFUSION ADD-ON: CPT

## 2018-04-13 PROCEDURE — 84702 CHORIONIC GONADOTROPIN TEST: CPT

## 2018-04-13 NOTE — C.PDOC
History Of Present Illness


27-year-old female, presents to the emergency department with complaints of 

migraine. Patient has a Hx of migraines and states this feels similar, she 

notes associated photophobia, and nausea with episodes of non-bloody/non-

bilious vomiting x2 days. Patient has a Hx of multiple visits to the ED for 

same complaint. Not the worst headache of her life. No numbness/weakness, 

dizziness, chest pain, shortness of breath, facial droop, sensory or vascular 

deficit. 


Chief Complaint (Nursing): Abdominal Pain


History Per: Patient


History/Exam Limitations: no limitations





Past Medical History


Reviewed: Historical Data, Nursing Documentation, Vital Signs


Vital Signs: 


 Last Vital Signs











Temp  98.4 F   04/13/18 22:25


 


Pulse  82   04/13/18 22:25


 


Resp  18   04/13/18 22:25


 


BP  120/77   04/13/18 22:25


 


Pulse Ox  100   04/13/18 22:25














- Medical History


PMH: Anxiety, Depression, Diabetes, Gastritis, HTN, Osteoporosis (pt unsure of 

this hx), Sickle Cell Disease, Chronic Pain


Surgical History: Endoscopy





- [facility] Procedures








ESOPHAGOGASTRODUODENOSCOPY [EGD] W/CLOSED BIOPSY (06/06/14)


INJECT/INFUSE ELECTROLYT (09/15/15)


INJECT/INFUSE NEC (09/15/15)








Family History: States: No Known Family Hx, Diabetes





- Social History


Hx Tobacco Use: Yes


Hx Alcohol Use: No


Hx Substance Use: Yes





- Immunization History


Hx Tetanus Toxoid Vaccination: Yes


Hx Influenza Vaccination: Yes


Hx Pneumococcal Vaccination: No





Review Of Systems


Constitutional: Negative for: Fever, Chills


Eyes: Negative for: Vision Change


Gastrointestinal: Negative for: Vomiting


Musculoskeletal: Negative for: Neck Pain, Back Pain


Neurological: Positive for: Headache.  Negative for: Weakness, Numbness, Change 

in Speech, Confusion, Seizures, Dizziness





Physical Exam





- Physical Exam


Appears: Non-toxic, No Acute Distress


Skin: Normal Color, Warm, Dry, No Rash


Head: Normacephalic


Eye(s): bilateral: Normal Inspection, PERRL, EOMI


Nose: Normal


Oral Mucosa: Moist


Lips: Normal Appearing


Neck: Normal ROM


Chest: Symmetrical


Cardiovascular: Rhythm Regular, No Murmur


Respiratory: Normal Breath Sounds, No Accessory Muscle Use


Extremity: Normal ROM, No Deformity, No Swelling


Neurological/Psych: Oriented x3, Normal Speech, Other (anxious appearing. )





ED Course And Treatment





- Laboratory Results


Result Diagrams: 


 04/13/18 21:04





 04/13/18 21:04


O2 Sat by Pulse Oximetry: 97 (RA)


Pulse Ox Interpretation: Normal





Disposition





- Disposition


Referrals: 


Nic Zhang Anneliesesuhail, [Non-Staff] - 


Disposition: HOME/ ROUTINE


Disposition Time: 22:00


Condition: IMPROVED


Additional Instructions: 








Thank you for letting us take care of you today. The emergency medical care you 

received today was directed at your acute symptoms. If you were prescribed any 

medication, please fill it and take as directed. It may take several days for 

your symptoms to resolve. Return to the Emergency Department if your symptoms 

worsen, do not improve, or if you have any other problems.





Please contact your doctor or call one of the physicians/clinics you have been 

referred to that are listed on the Patient Visit Information form that is 

included in your discharge packet. Bring any paperwork you were given at 

discharge with you along with any medications you are taking to your follow up 

visit. Our treatment cannot replace ongoing medical care by a primary care 

provider (PCP) outside of the emergency department.





Thank you for allowing the [facility] team to be part of your care today.











Follow up with your primary doctor in 2-3 days for re-evaluation and further 

management.


Instructions:  Gastritis, Migraine Headache (DC)


Forms:  [facility] (English)





- Clinical Impression


Clinical Impression: 


 Migraine, Gastritis








- Scribe Statement


The provider has reviewed the documentation as recorded by the Scribe (Jessica Bowden)








All medical record entries made by the Scribe were at my direction and 

personally dictated by me. I have reviewed the chart and agree that the record 

accurately reflects my personal performance of the history, physical exam, 

medical decision making, and the department course for this patient. I have 

also personally directed, reviewed, and agree with the discharge instructions 

and disposition.

## 2018-04-14 VITALS — OXYGEN SATURATION: 97 %

## 2018-05-07 ENCOUNTER — HOSPITAL ENCOUNTER (EMERGENCY)
Dept: HOSPITAL 31 - C.ER | Age: 28
Discharge: HOME | End: 2018-05-07
Payer: COMMERCIAL

## 2018-05-07 VITALS — SYSTOLIC BLOOD PRESSURE: 116 MMHG | HEART RATE: 82 BPM | TEMPERATURE: 98.2 F | DIASTOLIC BLOOD PRESSURE: 72 MMHG

## 2018-05-07 VITALS — OXYGEN SATURATION: 98 %

## 2018-05-07 VITALS — BODY MASS INDEX: 23.8 KG/M2

## 2018-05-07 VITALS — RESPIRATION RATE: 18 BRPM

## 2018-05-07 DIAGNOSIS — R10.84: ICD-10-CM

## 2018-05-07 DIAGNOSIS — I10: ICD-10-CM

## 2018-05-07 DIAGNOSIS — Z72.0: ICD-10-CM

## 2018-05-07 DIAGNOSIS — E11.9: ICD-10-CM

## 2018-05-07 DIAGNOSIS — G89.29: Primary | ICD-10-CM

## 2018-05-07 LAB
ALBUMIN SERPL-MCNC: 4.9 G/DL (ref 3.5–5)
ALBUMIN/GLOB SERPL: 1.2 {RATIO} (ref 1–2.1)
ALT SERPL-CCNC: 31 U/L (ref 9–52)
AST SERPL-CCNC: 34 U/L (ref 14–36)
BASOPHILS # BLD AUTO: 0.1 K/UL (ref 0–0.2)
BASOPHILS NFR BLD: 0.8 % (ref 0–2)
BILIRUB UR-MCNC: NEGATIVE MG/DL
BUN SERPL-MCNC: 12 MG/DL (ref 7–17)
CALCIUM SERPL-MCNC: 9.1 MG/DL (ref 8.6–10.4)
EOSINOPHIL # BLD AUTO: 0 K/UL (ref 0–0.7)
EOSINOPHIL NFR BLD: 0.1 % (ref 0–4)
ERYTHROCYTE [DISTWIDTH] IN BLOOD BY AUTOMATED COUNT: 12.9 % (ref 11.5–14.5)
GFR NON-AFRICAN AMERICAN: > 60
GLUCOSE UR STRIP-MCNC: NORMAL MG/DL
HCG,QUALITATIVE URINE: NEGATIVE
HGB BLD-MCNC: 13 G/DL (ref 11–16)
LEUKOCYTE ESTERASE UR-ACNC: (no result) LEU/UL
LIPASE: 248 U/L (ref 23–300)
LYMPHOCYTE: 10 % (ref 20–40)
LYMPHOCYTES # BLD AUTO: 1 K/UL (ref 1–4.3)
LYMPHOCYTES NFR BLD AUTO: 9.4 % (ref 20–40)
MCH RBC QN AUTO: 28.1 PG (ref 27–31)
MCHC RBC AUTO-ENTMCNC: 34.3 G/DL (ref 33–37)
MCV RBC AUTO: 82 FL (ref 81–99)
MONOCYTE: 3 % (ref 0–10)
MONOCYTES # BLD: 0.2 K/UL (ref 0–0.8)
MONOCYTES NFR BLD: 2.3 % (ref 0–10)
NEUTROPHILS # BLD: 9.2 K/UL (ref 1.8–7)
NEUTROPHILS NFR BLD AUTO: 87 % (ref 50–75)
NEUTROPHILS NFR BLD AUTO: 87.4 % (ref 50–75)
NRBC BLD AUTO-RTO: 0 % (ref 0–2)
PH UR STRIP: 5 [PH] (ref 5–8)
PLATELET # BLD EST: NORMAL 10*3/UL
PLATELET # BLD: 256 K/UL (ref 130–400)
PMV BLD AUTO: 8.8 FL (ref 7.2–11.7)
PROT UR STRIP-MCNC: (no result) MG/DL
RBC # BLD AUTO: 4.61 MIL/UL (ref 3.8–5.2)
RBC # UR STRIP: (no result) /UL
RBC MORPH BLD: NORMAL
SP GR UR STRIP: 1.01 (ref 1–1.03)
SQUAMOUS EPITHIAL: 1 /HPF (ref 0–5)
TOTAL CELLS COUNTED BLD: 100
UROBILINOGEN UR-MCNC: NORMAL MG/DL (ref 0.2–1)
WBC # BLD AUTO: 10.5 K/UL (ref 4.8–10.8)

## 2018-05-07 PROCEDURE — 96375 TX/PRO/DX INJ NEW DRUG ADDON: CPT

## 2018-05-07 PROCEDURE — 82948 REAGENT STRIP/BLOOD GLUCOSE: CPT

## 2018-05-07 PROCEDURE — 84703 CHORIONIC GONADOTROPIN ASSAY: CPT

## 2018-05-07 PROCEDURE — 80353 DRUG SCREENING COCAINE: CPT

## 2018-05-07 PROCEDURE — 80349 CANNABINOIDS NATURAL: CPT

## 2018-05-07 PROCEDURE — 83690 ASSAY OF LIPASE: CPT

## 2018-05-07 PROCEDURE — 80358 DRUG SCREENING METHADONE: CPT

## 2018-05-07 PROCEDURE — 83992 ASSAY FOR PHENCYCLIDINE: CPT

## 2018-05-07 PROCEDURE — 80053 COMPREHEN METABOLIC PANEL: CPT

## 2018-05-07 PROCEDURE — 80345 DRUG SCREENING BARBITURATES: CPT

## 2018-05-07 PROCEDURE — 99285 EMERGENCY DEPT VISIT HI MDM: CPT

## 2018-05-07 PROCEDURE — 80324 DRUG SCREEN AMPHETAMINES 1/2: CPT

## 2018-05-07 PROCEDURE — 85025 COMPLETE CBC W/AUTO DIFF WBC: CPT

## 2018-05-07 PROCEDURE — 80361 OPIATES 1 OR MORE: CPT

## 2018-05-07 PROCEDURE — 81001 URINALYSIS AUTO W/SCOPE: CPT

## 2018-05-07 PROCEDURE — 96374 THER/PROPH/DIAG INJ IV PUSH: CPT

## 2018-05-07 PROCEDURE — 80346 BENZODIAZEPINES1-12: CPT

## 2018-05-07 NOTE — C.PDOC
History Of Present Illness


26 y/o female with a history of chronic abdominal pain, sickle trait anemia, DM

, and substance abuse presents to the ED for exacerbating abdominal pain. 

Patient is well known to [facility] facilities and has been arrested in the 

past for provider confrontations. She denies any hematuria, nausea, vomiting, 

fever, and diarrhea. Patient has no other complaints.





PMD: none provided








Chief Complaint (Nursing): Abdominal Pain


History Per: Patient


History/Exam Limitations: no limitations


Onset/Duration Of Symptoms: Hrs


Current Symptoms Are (Timing): Still Present


Quality Of Discomfort: "Pain"


Associated Symptoms: denies: Fever, Nausea, Vomiting, Diarrhea, Urinary Symptoms


Recent travel outside of the [place]: No





Past Medical History


Reviewed: Historical Data, Nursing Documentation, Vital Signs


Vital Signs: 


 Last Vital Signs











Temp  98.2 F   05/07/18 23:11


 


Pulse  82   05/07/18 23:11


 


Resp  18   05/07/18 23:11


 


BP  116/72   05/07/18 23:11


 


Pulse Ox  98   05/07/18 23:11














- Medical History


PMH: Anxiety, Depression, Diabetes, Gastritis, HTN, Osteoporosis (pt unsure of 

this hx), Sickle Cell Disease, Chronic Pain


   Denies: Crohn's Disease, Diverticulitis, Gall Bladder Disease, HIV, 

Pancreatitis, Chronic Kidney Disease


Surgical History: Endoscopy





- [facility] Procedures








ESOPHAGOGASTRODUODENOSCOPY [EGD] W/CLOSED BIOPSY (06/06/14)


INJECT/INFUSE ELECTROLYT (09/15/15)


INJECT/INFUSE NEC (09/15/15)








Family History: States: Unknown Family Hx, Diabetes





- Social History


Hx Tobacco Use: Yes


Hx Alcohol Use: No


Hx Substance Use: Yes (WEED)





- Immunization History


Hx Tetanus Toxoid Vaccination: Yes


Hx Influenza Vaccination: Yes


Hx Pneumococcal Vaccination: No





Review Of Systems


Except As Marked, All Systems Reviewed And Found Negative.


Constitutional: Negative for: Fever


Gastrointestinal: Positive for: Abdominal Pain.  Negative for: Nausea, Vomiting

, Diarrhea


Genitourinary: Negative for: Hematuria





Physical Exam





- Physical Exam


Appears: Well, No Acute Distress, Other (when approached at bed side patient 

was under the influence of drugs, cooperative of questions)


Skin: Normal Color, Warm, Dry


Head: Atraumatic, Normacephalic


Eye(s): bilateral: Normal Inspection, PERRL, EOMI


Nose: Normal


Throat: Normal


Neck: Normal


Cardiovascular: Rhythm Regular, No Murmur


Respiratory: Normal Breath Sounds, No Decreased Breath Sounds


Gastrointestinal/Abdominal: Bowel Sounds (normal), Soft, No Mass, Guarding (

mild guarding at epigastric area), No Rebound


Back: Normal Inspection, No CVA Tenderness, No Vertebral Tenderness


Extremity: Normal ROM, No Pedal Edema


Neurological/Psych: Oriented x3





ED Course And Treatment





- Laboratory Results


Result Diagrams: 


 05/07/18 21:12





 05/07/18 21:12


O2 Sat by Pulse Oximetry: 98 (RA)


Pulse Ox Interpretation: Normal





Medical Decision Making


Medical Decision Making: 


Time: 18:47





Impression: Chronic abdominal pain





Initial Plan:


* CBC


* Toradol 30 mg IVP


* Compazine 5 mg IVP


* Ultram 50 mg PO


* Uranalysis








Patient being admitted for anemia.


--------------------------------------------------------------------------------

-----------------


Scribe Attestation:


Documented by Zora Gtz acting as a scribe Mikie Burrell MD.





MD Scribe Attestation: 


All medical record entries made by the Scribe were at my direction and 

personally dictated by me. I have reviewed the chart and agree that the record 

accurately reflects my personal performance of the history, physical exam, 

medical decision making, and the department course for this patient. I have 

also personally directed, reviewed, and agree with the discharge instructions 

and disposition.





Disposition





- Disposition


Referrals: 


[facility] [Outside]


Disposition: HOME/ ROUTINE


Disposition Time: 08:49


Condition: FAIR


Instructions:  Acute Abdomen (Belly Pain)


Forms:  [facility] (English)


Print Language: ENGLISH





- Clinical Impression


Clinical Impression: 


 Abdominal pain, chronic, generalized

## 2018-07-28 ENCOUNTER — HOSPITAL ENCOUNTER (EMERGENCY)
Dept: HOSPITAL 31 - C.ER | Age: 28
Discharge: HOME | End: 2018-07-28
Payer: COMMERCIAL

## 2018-07-28 VITALS — BODY MASS INDEX: 23.8 KG/M2

## 2018-07-28 VITALS
DIASTOLIC BLOOD PRESSURE: 98 MMHG | SYSTOLIC BLOOD PRESSURE: 168 MMHG | HEART RATE: 64 BPM | TEMPERATURE: 97.7 F | OXYGEN SATURATION: 100 % | RESPIRATION RATE: 18 BRPM

## 2018-07-28 DIAGNOSIS — R10.84: ICD-10-CM

## 2018-07-28 DIAGNOSIS — G89.29: Primary | ICD-10-CM

## 2018-07-28 LAB
BACTERIA #/AREA URNS HPF: (no result) /[HPF]
BILIRUB UR-MCNC: NEGATIVE MG/DL
GLUCOSE UR STRIP-MCNC: (no result) MG/DL
LEUKOCYTE ESTERASE UR-ACNC: (no result) LEU/UL
PH UR STRIP: 6 [PH] (ref 5–8)
PROT UR STRIP-MCNC: (no result) MG/DL
RBC # UR STRIP: (no result) /UL
SP GR UR STRIP: 1.01 (ref 1–1.03)
SQUAMOUS EPITHIAL: 129 /HPF (ref 0–5)
UROBILINOGEN UR-MCNC: NORMAL MG/DL (ref 0.2–1)

## 2018-07-28 PROCEDURE — 96372 THER/PROPH/DIAG INJ SC/IM: CPT

## 2018-07-28 PROCEDURE — 99284 EMERGENCY DEPT VISIT MOD MDM: CPT

## 2018-07-28 PROCEDURE — 81001 URINALYSIS AUTO W/SCOPE: CPT

## 2018-07-28 NOTE — C.PDOC
History Of Present Illness


27 y/o female presents to ED c/o cramping epigastric abdominal pain, nausea, 

and vomiting for the last 3 days. Pt had multiple prior visits for similar 

symptoms. Denies fever, chills, urinary symptoms, or any other complaints. 





Time Seen by Provider: 07/28/18 15:56


Chief Complaint (Nursing): Abdominal Pain


History Per: Patient


History/Exam Limitations: no limitations





Past Medical History


Reviewed: Historical Data, Nursing Documentation, Vital Signs


Vital Signs: 


 Last Vital Signs











Temp  97.7 F   07/28/18 15:26


 


Pulse  64   07/28/18 15:26


 


Resp  18   07/28/18 15:26


 


BP  168/98 H  07/28/18 15:26


 


Pulse Ox  100   07/28/18 16:42














- Medical History


PMH: Anxiety, Depression, Diabetes, Gastritis, HTN, Osteoporosis (pt unsure of 

this hx), Sickle Cell Disease, Chronic Pain


   Denies: Crohn's Disease, Diverticulitis, Gall Bladder Disease, HIV, 

Pancreatitis, Chronic Kidney Disease


Surgical History: Endoscopy





- [facility] Procedures








ESOPHAGOGASTRODUODENOSCOPY [EGD] W/CLOSED BIOPSY (06/06/14)


INJECT/INFUSE ELECTROLYT (09/15/15)


INJECT/INFUSE NEC (09/15/15)








Family History: States: Unknown Family Hx, Diabetes





- Social History


Hx Tobacco Use: Yes


Hx Alcohol Use: No


Hx Substance Use: Yes (WEED)





- Immunization History


Hx Tetanus Toxoid Vaccination: Yes


Hx Influenza Vaccination: Yes


Hx Pneumococcal Vaccination: No





Review Of Systems


Except As Marked, All Systems Reviewed And Found Negative.


Constitutional: Negative for: Fever, Chills


Gastrointestinal: Positive for: Nausea, Vomiting, Abdominal Pain


Genitourinary: Negative for: Dysuria, Frequency, Hematuria


Musculoskeletal: Negative for: Back Pain





Physical Exam





- Physical Exam


Appears: Non-toxic, No Acute Distress, Other (bizarre affect; occasionally manic

)


Skin: Normal Color, Warm, Dry


Head: Atraumatic, Normacephalic


Eye(s): bilateral: Normal Inspection


Oral Mucosa: Moist


Neck: Supple


Cardiovascular: Rhythm Regular


Respiratory: Normal Breath Sounds, No Rales, No Rhonchi, No Wheezing


Gastrointestinal/Abdominal: Soft, No Tenderness, No Guarding, No Rebound


Back: No CVA Tenderness


Extremity: Normal ROM


Neurological/Psych: Oriented x3, Normal Speech





ED Course And Treatment


O2 Sat by Pulse Oximetry: 100


Pulse Ox Interpretation: Normal





Medical Decision Making


Medical Decision Making: 





typical reflux


NO h/o gastroparesis


improved with PO ED tx





UA/preg neg.





Disposition


Doctor Will See Patient In The: Office


Counseled Patient/Family Regarding: Studies Performed, Diagnosis





- Disposition


Referrals: 


 Service [Outside]


[facility] [Outside]


[facility] [Outside]


Disposition: HOME/ ROUTINE


Disposition Time: 16:41


Condition: GOOD


Additional Instructions: 


continue Maalox 30 cc's (one tablespoon) every 3-4 hours as needed for Reflux 

symptoms





Diet and exercise changes to avoid GERD and constipation


drink plenty of water.





Follow-up in our outpatient Clinic as needed. 


Instructions:  Chronic Pain (DC), Nausea and Vomiting, Adult


Forms:  [facility] (English)





- Clinical Impression


Clinical Impression: 


 Abdominal pain, chronic, generalized








- Scribe Statement


The provider has reviewed the documentation as recorded by the Scribe





KP





All medical record entries made by the Scribe were at my direction and 

personally dictated by me. I have reviewed the chart and agree that the record 

accurately reflects my personal performance of the history, physical exam, 

medical decision making, and the department course for this patient. I have 

also personally directed, reviewed, and agree with the discharge instructions 

and disposition.

## 2018-08-14 ENCOUNTER — HOSPITAL ENCOUNTER (EMERGENCY)
Dept: HOSPITAL 31 - C.ER | Age: 28
Discharge: HOME | End: 2018-08-14
Payer: COMMERCIAL

## 2018-08-14 VITALS — TEMPERATURE: 98.5 F | RESPIRATION RATE: 18 BRPM

## 2018-08-14 VITALS — HEART RATE: 67 BPM | SYSTOLIC BLOOD PRESSURE: 113 MMHG | DIASTOLIC BLOOD PRESSURE: 76 MMHG

## 2018-08-14 VITALS — OXYGEN SATURATION: 97 %

## 2018-08-14 VITALS — BODY MASS INDEX: 23.8 KG/M2

## 2018-08-14 DIAGNOSIS — I10: ICD-10-CM

## 2018-08-14 DIAGNOSIS — E11.9: ICD-10-CM

## 2018-08-14 DIAGNOSIS — Z32.01: Primary | ICD-10-CM

## 2018-08-14 LAB
BACTERIA #/AREA URNS HPF: (no result) /[HPF]
BILIRUB UR-MCNC: NEGATIVE MG/DL
GLUCOSE UR STRIP-MCNC: NORMAL MG/DL
HCG,QUALITATIVE URINE: POSITIVE
HYALINE CASTS #/AREA URNS LPF: (no result) /LPF (ref 0–2)
LEUKOCYTE ESTERASE UR-ACNC: (no result) LEU/UL
PH UR STRIP: 7 [PH] (ref 5–8)
PROT UR STRIP-MCNC: (no result) MG/DL
RBC # UR STRIP: NEGATIVE /UL
SP GR UR STRIP: 1.01 (ref 1–1.03)
SQUAMOUS EPITHIAL: 3 /HPF (ref 0–5)
URINE AMORPHOUS SEDIMENT: (no result) /UL
UROBILINOGEN UR-MCNC: NORMAL MG/DL (ref 0.2–1)

## 2018-08-14 NOTE — C.PDOC
History Of Present Illness


28 year old female presents to the ER with a complaint of nausea and vomiting 

for the past 3 days. Also reports diarrhea. Denies chest pain, SOB, dizziness, 

or headache, abdominal pain.


Time Seen by Provider: 18 04:36


Chief Complaint (Nursing): GI Problem


History Per: Patient


History/Exam Limitations: no limitations


Onset/Duration Of Symptoms: Days


Current Symptoms Are (Timing): Still Present


Associated Symptoms: Nausea, Vomiting.  denies: Fever, Chills, Chest Pain, 

Other (SOB, Headache, Dizziness)


Exacerbating Factors: None


Alleviating Factors: None


Recent travel outside of the [place]: No


Abnormal Vaginal Bleeding: No





Past Medical History


Reviewed: Historical Data, Nursing Documentation, Vital Signs


Vital Signs: 


 Last Vital Signs











Temp  98.5 F   18 04:26


 


Pulse  69   18 04:26


 


Resp  18   18 04:26


 


BP  180/94 H  18 04:26


 


Pulse Ox  97   18 04:56














- Medical History


PMH: Anxiety, Depression, Diabetes, Gastritis, HTN, Osteoporosis (pt unsure of 

this hx), Sickle Cell Disease, Chronic Pain


   Denies: Crohn's Disease, Diverticulitis, Gall Bladder Disease, HIV, 

Pancreatitis, Chronic Kidney Disease


Surgical History: Endoscopy





- [facility] Procedures








ESOPHAGOGASTRODUODENOSCOPY [EGD] W/CLOSED BIOPSY (14)


INJECT/INFUSE ELECTROLYT (09/15/15)


INJECT/INFUSE NEC (09/15/15)








Family History: States: Unknown Family Hx, Diabetes





- Social History


Hx Tobacco Use: Yes


Hx Alcohol Use: No


Hx Substance Use: Yes (WEED)





- Immunization History


Hx Tetanus Toxoid Vaccination: Yes


Hx Influenza Vaccination: Yes


Hx Pneumococcal Vaccination: No





Review Of Systems


Constitutional: Negative for: Fever, Chills


Cardiovascular: Negative for: Chest Pain


Respiratory: Negative for: Shortness of Breath


Gastrointestinal: Positive for: Nausea, Vomiting


Neurological: Negative for: Headache, Dizziness





Physical Exam





- Physical Exam


Appears: Non-toxic


Skin: Normal Color, Warm, Dry


Head: Atraumatic, Normacephalic


Eye(s): bilateral: Normal Inspection


Oral Mucosa: Moist


Neck: Normal, Supple


Chest: Symmetrical, No Tenderness


Cardiovascular: Rhythm Regular


Respiratory: Normal Breath Sounds, No Rales, No Rhonchi, No Wheezing


Gastrointestinal/Abdominal: Soft, No Tenderness


Neurological/Psych: Oriented x3, Normal Speech





ED Course And Treatment


O2 Sat by Pulse Oximetry: 97 (Room air)


Pulse Ox Interpretation: Normal





Medical Decision Making


Medical Decision Making: 


Patient with nausea and vomiting for few days. She states her epigastric area 

and throat hurts after vomiting. She wants phenergan for nausea, as zofan not 

helping. Urinalysis ordered, results positive for pregnancy. Patient informed 

of results, she was not surprised and states she wants an . I gave 

patient information for the wom[facility] clinic. She denies pelvic pain or bleeding.  

Patient is walking around back and forth in the ER asking for juice and ice and 

ginger ale. Patient drinking and tolerating. Patient without fever. Abdomen 

soft and no guarding or rebound. Advise patient to follow up.  





Disposition


Counseled Patient/Family Regarding: Diagnosis, Need For Followup, Rx Given





- Disposition


[facility]errals[facility] 


[facility] [Outside]


Disposition: HOME/ ROUTINE


Disposition Time: 05:19


Condition: STABLE


Additional Instructions: 


Follow up in the clinic


Prescriptions: 


Ondansetron ODT [Zofran ODT] 1 odt PO BID PRN #6 odt


 PRN Reason: Nausea/Vomiting


Instructions:  Pregnancy (ED)


Forms:  [facility] (English)





- POA


Present On Arrival: None





- Clinical Impression


Clinical Impression: 


 Positive pregnancy test








- PA / NP / Resident Statement


MD/DO has reviewed & agrees with the documentation as recorded.





- Scribe Statement


The provider has reviewed the documentation as recorded by the Scribsulema Alford





All medical record entries made by the Lanibsulema were at my direction and 

personally dictated by me. I have reviewed the chart and agree that the record 

accurately reflects my personal performance of the history, physical exam, 

medical decision making, and the department course for this patient. I have 

also personally directed, reviewed, and agree with the discharge instructions 

and disposition.

## 2018-09-16 ENCOUNTER — HOSPITAL ENCOUNTER (EMERGENCY)
Dept: HOSPITAL 31 - C.ER | Age: 28
Discharge: HOME | End: 2018-09-16
Payer: COMMERCIAL

## 2018-09-16 VITALS
TEMPERATURE: 98.7 F | SYSTOLIC BLOOD PRESSURE: 104 MMHG | HEART RATE: 103 BPM | OXYGEN SATURATION: 99 % | DIASTOLIC BLOOD PRESSURE: 53 MMHG | RESPIRATION RATE: 16 BRPM

## 2018-09-16 VITALS — BODY MASS INDEX: 23.8 KG/M2

## 2018-09-16 DIAGNOSIS — E87.6: ICD-10-CM

## 2018-09-16 DIAGNOSIS — O26.891: Primary | ICD-10-CM

## 2018-09-16 DIAGNOSIS — R10.32: ICD-10-CM

## 2018-09-16 DIAGNOSIS — Z3A.11: ICD-10-CM

## 2018-09-16 DIAGNOSIS — O21.9: ICD-10-CM

## 2018-09-16 LAB
ALBUMIN SERPL-MCNC: 4.2 G/DL (ref 3.5–5)
ALBUMIN/GLOB SERPL: 1.4 {RATIO} (ref 1–2.1)
ALT SERPL-CCNC: 28 U/L (ref 9–52)
AST SERPL-CCNC: 19 U/L (ref 14–36)
BASOPHILS # BLD AUTO: 0.1 K/UL (ref 0–0.2)
BASOPHILS NFR BLD: 0.9 % (ref 0–2)
BILIRUB UR-MCNC: NEGATIVE MG/DL
BUN SERPL-MCNC: 6 MG/DL (ref 7–17)
CALCIUM SERPL-MCNC: 9.2 MG/DL (ref 8.6–10.4)
EOSINOPHIL # BLD AUTO: 0.1 K/UL (ref 0–0.7)
EOSINOPHIL NFR BLD: 0.5 % (ref 0–4)
ERYTHROCYTE [DISTWIDTH] IN BLOOD BY AUTOMATED COUNT: 13.6 % (ref 11.5–14.5)
GFR NON-AFRICAN AMERICAN: > 60
GLUCOSE UR STRIP-MCNC: NORMAL MG/DL
HGB BLD-MCNC: 11.2 G/DL (ref 11–16)
LEUKOCYTE ESTERASE UR-ACNC: (no result) LEU/UL
LYMPHOCYTES # BLD AUTO: 2.4 K/UL (ref 1–4.3)
LYMPHOCYTES NFR BLD AUTO: 21.2 % (ref 20–40)
MCH RBC QN AUTO: 27.7 PG (ref 27–31)
MCHC RBC AUTO-ENTMCNC: 34 G/DL (ref 33–37)
MCV RBC AUTO: 81.7 FL (ref 81–99)
MONOCYTES # BLD: 0.6 K/UL (ref 0–0.8)
MONOCYTES NFR BLD: 5.8 % (ref 0–10)
NEUTROPHILS # BLD: 8 K/UL (ref 1.8–7)
NEUTROPHILS NFR BLD AUTO: 71.6 % (ref 50–75)
NRBC BLD AUTO-RTO: 0 % (ref 0–2)
PH UR STRIP: 5 [PH] (ref 5–8)
PLATELET # BLD: 290 K/UL (ref 130–400)
PMV BLD AUTO: 7.2 FL (ref 7.2–11.7)
PROT UR STRIP-MCNC: NEGATIVE MG/DL
RBC # BLD AUTO: 4.02 MIL/UL (ref 3.8–5.2)
RBC # UR STRIP: (no result) /UL
SP GR UR STRIP: 1.02 (ref 1–1.03)
SQUAMOUS EPITHIAL: 1 /HPF (ref 0–5)
UROBILINOGEN UR-MCNC: NORMAL MG/DL (ref 0.2–1)
WBC # BLD AUTO: 11.2 K/UL (ref 4.8–10.8)

## 2018-09-16 PROCEDURE — 80053 COMPREHEN METABOLIC PANEL: CPT

## 2018-09-16 PROCEDURE — 81001 URINALYSIS AUTO W/SCOPE: CPT

## 2018-09-16 PROCEDURE — 99284 EMERGENCY DEPT VISIT MOD MDM: CPT

## 2018-09-16 PROCEDURE — 85025 COMPLETE CBC W/AUTO DIFF WBC: CPT

## 2018-09-16 PROCEDURE — 96361 HYDRATE IV INFUSION ADD-ON: CPT

## 2018-09-16 PROCEDURE — 76801 OB US < 14 WKS SINGLE FETUS: CPT

## 2018-09-16 PROCEDURE — 96374 THER/PROPH/DIAG INJ IV PUSH: CPT

## 2018-09-16 PROCEDURE — 84702 CHORIONIC GONADOTROPIN TEST: CPT

## 2018-09-16 NOTE — US
Date of service: 



09/16/2018



PROCEDURE:  OB Pelvic Ultrasound



HISTORY:

LLQ, left adnexal pain



LMP: June 2018



COMPARISON:

No relevant prior imaging



FINDINGS:



UTERUS:

Gestational sac: Single intrauterine gestation.Measures 6.1 cm, out 

of range 



Yolk sac: Measures 0.5 cm. 



Fetal pole: Crown-rump length 4.5 cm compatible with estimated 

gestational age of 11 weeks, 2 days 



Fetal Heart rate: 148 bpm.



Fetal age (Ultrasound estimated): 11 weeks, 2 days



Shawna-gestational hemorrhage: None.



Date of delivery (Ultrasound estimated) : 04/05/2019



Uterus measures 11.3 x 9.2 x 9.1 cm. Normal in size and appearance. 



CERVIX:

Measures 3.4 cm. Long and closed. No cervical abnormality seen.



RIGHT OVARY:

Measures 3.5 x 1.5 x 2.3 cm. No mass lesion. Normal flow. 



LEFT OVARY:

Measures 2.9 x 1.9 x 3.2 cm. No solid mass. Normal flow. 



FREE FLUID:

None.



OTHER FINDINGS:

None. 



IMPRESSION:

Single live intrauterine gestation with average ultrasound age of 11 

weeks, 2 days.  Fetal heart rate 148 beats per minute.  Cervix long 

and closed.  Low lying placenta.

## 2018-09-16 NOTE — C.PDOC
History Of Present Illness


28 year old female presents to the emergency department with complaints of left 

lower quadrant pain since Monday. Patient states that she is currently 11 weeks 

pregnant (). She denies vaginal bleeding, dysuria, hematuria, diarrhea. She 

admits to nausea and one episode of vomiting. Patient states that she has had 

prenatal care under Dr. Banda at [facility]. 





Time Seen by Provider: 18 15:35


Chief Complaint (Nursing): Abdominal Pain


History Per: Patient


History/Exam Limitations: no limitations


Onset/Duration Of Symptoms: Days (6)


Current Symptoms Are (Timing): Still Present


Context: Other (pregnancy)


Location Of Pain/Discomfort: LLQ


Quality Of Discomfort: "Pain"


Associated Symptoms: Nausea, Vomiting.  denies: Diarrhea, Urinary Symptoms, 

Other (vaginal bleeding)


Abnormal Vaginal Bleeding: No


: 2


Para: 1





Past Medical History


Reviewed: Historical Data, Nursing Documentation, Vital Signs


Vital Signs: 


 Last Vital Signs











Temp  99.3 F   18 15:27


 


Pulse  78   18 15:27


 


Resp  20   18 15:27


 


BP  114/77   18 15:27


 


Pulse Ox  97   18 17:27














- Medical History


PMH: Anxiety, Depression, Diabetes, Gastritis, HTN, Osteoporosis (pt unsure of 

this hx), Sickle Cell Disease, Chronic Pain


   Denies: Crohn's Disease, Diverticulitis, Gall Bladder Disease, HIV, 

Pancreatitis, Chronic Kidney Disease


Surgical History: Endoscopy





- [facility] Procedures








ESOPHAGOGASTRODUODENOSCOPY [EGD] W/CLOSED BIOPSY (14)


INJECT/INFUSE ELECTROLYT (09/15/15)


INJECT/INFUSE NEC (09/15/15)








Family History: States: Unknown Family Hx, Diabetes





- Social History


Hx Tobacco Use: Yes


Hx Alcohol Use: No


Hx Substance Use: Yes (WEED)





- Immunization History


Hx Tetanus Toxoid Vaccination: Yes


Hx Influenza Vaccination: Yes


Hx Pneumococcal Vaccination: No





Review Of Systems


Except As Marked, All Systems Reviewed And Found Negative.


Gastrointestinal: Positive for: Nausea, Vomiting, Abdominal Pain.  Negative for

: Diarrhea


Genitourinary: Negative for: Dysuria, Hematuria, Vaginal Bleeding





Physical Exam





- Physical Exam


Appears: Non-toxic, In Acute Distress (uncomfortable)


Skin: Warm, Dry


Head: Atraumatic, Normacephalic


Eye(s): bilateral: Normal Inspection


Nose: Normal


Neck: Normal, Supple


Chest: Symmetrical, No Tenderness


Cardiovascular: Rhythm Regular, No Murmur


Respiratory: Normal Breath Sounds, No Rales, No Rhonchi, No Wheezing


Gastrointestinal/Abdominal: Soft, Tenderness (LLQ), No Guarding, No Rebound


Back: No CVA Tenderness


Extremity: Normal ROM (all extremities)


Neurological/Psych: Oriented x3, Normal Speech, Normal Cognition





ED Course And Treatment





- Laboratory Results


Result Diagrams: 


 18 16:18





 18 16:18


O2 Sat by Pulse Oximetry: 97 (RA)


Pulse Ox Interpretation: Normal





- Other Rad


  ** XR Obstructive Series


X-Ray: Viewed By Me, Read By Radiologist


Interpretation: PROCEDURE:  OB Pelvic Ultrasound.  HISTORY:  LLQ, left adnexal 

pain.  LMP: 2018.  COMPARISON:  No relevant prior imaging.  FINDINGS:  

UTERUS:  Gestational sac: Single intrauterine gestation.Measures 6.1 cm, out of 

range.  Yolk sac: Measures 0.5 cm.  Fetal pole: Crown-rump length 4.5 cm 

compatible with estimated gestational age of 11 weeks, 2 days.  Fetal Heart rate

: 148 bpm.  Fetal age (Ultrasound estimated): 11 weeks, 2 days.  Shawna-

gestational hemorrhage: None.  Date of delivery (Ultrasound estimated) : 2019.  Uterus measures 11.3 x 9.2 x 9.1 cm. Normal in size and appearance.  

CERVIX:  Measures 3.4 cm. Long and closed. No cervical abnormality seen.  RIGHT 

OVARY:  Measures 3.5 x 1.5 x 2.3 cm. No mass lesion. Normal flow.  LEFT OVARY:  

Measures 2.9 x 1.9 x 3.2 cm. No solid mass. Normal flow.  FREE FLUID:  None.  

OTHER FINDINGS:  None.  IMPRESSION:  Single live intrauterine gestation with 

average ultrasound age of 11 weeks, 2 days.  Fetal heart rate 148 beats per 

minute.  Cervix long and closed.  Low lying placenta.


Progress Note: Plan:  Pregnancy workup





Disposition


Counseled Patient/Family Regarding: Studies Performed, Diagnosis, Need For 

Followup, Rx Given





- Disposition


Referrals: 


Amarilis Dejesus MD [Staff Provider] - 


Disposition: HOME/ ROUTINE


Disposition Time: 17:25


Condition: STABLE


Additional Instructions: 


FOLLOW UP WITH YOUR OB/GYN WITHIN 1 WEEK





USE TYLENOL AS NEEDED FOR PAIN





DRINK PLENTY OF FLUIDS





RETURN TO EMERGENCY ROOM IF SYMPTOMS WORSEN 








SEGUIMIENTO CON TU OB / GYN DENTRO DE 1 SEMANA





USE TYLENOL SEGN SEA NECESARIO PARA DOLOR





BEBER MUCHO LQUIDO





REGRESE AL ABDELRAHMAN DE EMERGENCIA SI LOS SNTOMAS EMPEORAN


Prescriptions: 


Acetaminophen [Tylenol 325mg tab] 650 mg PO Q6 PRN #30 tab


 PRN Reason: pain/fever


Instructions:  Pregnancy Symptoms, Acute Pelvic Pain (DC)


Forms:  [employer] (English)


Print Language: ENGLISH





- POA


Present On Arrival: None





- Clinical Impression


Clinical Impression: 


 Abdominal pain, Pregnancy, Nausea, Vomiting








- Scribe Statement


The provider has reviewed the documentation as recorded by the Scribe (Javy Kirby)


Provider Attestation: 


All medical record entries made by the Scribe were at my direction and 

personally dictated by me. I have reviewed the chart and agree that the record 

accurately reflects my personal performance of the history, physical exam, 

medical decision making, and the department course for this patient. I have 

also personally directed, reviewed, and agree with the discharge instructions 

and disposition.

## 2021-06-22 NOTE — CARD
--------------- APPROVED REPORT --------------





EKG Measurement

Heart [id number]

ID [id number]

[id number]

[id number]

[id number]



<Conclusion>

Normal sinus rhythm with sinus arrhythmia

Normal ECG
sounds: Decreased air movement present. Examination of the right-upper field reveals rhonchi. Examination of the left-upper field reveals rhonchi. Examination of the left-middle field reveals decreased breath sounds. Examination of the right-lower field reveals decreased breath sounds. Examination of the left-lower field reveals decreased breath sounds. Decreased breath sounds and rhonchi present. No wheezing or rales. Skin:                      An electronic signature was used to authenticate this note.     --Jorgito Carlson MD